# Patient Record
Sex: FEMALE | Race: WHITE | NOT HISPANIC OR LATINO | URBAN - METROPOLITAN AREA
[De-identification: names, ages, dates, MRNs, and addresses within clinical notes are randomized per-mention and may not be internally consistent; named-entity substitution may affect disease eponyms.]

---

## 2017-02-13 ENCOUNTER — EMERGENCY (EMERGENCY)
Facility: HOSPITAL | Age: 57
LOS: 1 days | Discharge: HOME | End: 2017-02-13
Attending: EMERGENCY MEDICINE
Payer: SELF-PAY

## 2017-02-13 VITALS
SYSTOLIC BLOOD PRESSURE: 103 MMHG | RESPIRATION RATE: 16 BRPM | HEART RATE: 71 BPM | DIASTOLIC BLOOD PRESSURE: 67 MMHG | HEIGHT: 66 IN | TEMPERATURE: 98 F | WEIGHT: 134.92 LBS | OXYGEN SATURATION: 94 %
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
## 2017-02-13 DIAGNOSIS — T40.601A POISONING BY UNSPECIFIED NARCOTICS, ACCIDENTAL (UNINTENTIONAL), INITIAL ENCOUNTER: ICD-10-CM
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
## 2017-02-13 LAB
ALBUMIN SERPL ELPH-MCNC: 3 G/DL — LOW (ref 3.5–5)
ALP SERPL-CCNC: 88 U/L — SIGNIFICANT CHANGE UP (ref 40–120)
ALT FLD-CCNC: 29 U/L DA — SIGNIFICANT CHANGE UP (ref 10–60)
ANION GAP SERPL CALC-SCNC: 6 MMOL/L — SIGNIFICANT CHANGE UP (ref 5–17)
AST SERPL-CCNC: 27 U/L — SIGNIFICANT CHANGE UP (ref 10–40)
BASOPHILS # BLD AUTO: 0.1 K/UL — SIGNIFICANT CHANGE UP (ref 0–0.2)
BASOPHILS NFR BLD AUTO: 0.7 % — SIGNIFICANT CHANGE UP (ref 0–2)
BILIRUB SERPL-MCNC: 0.1 MG/DL — LOW (ref 0.2–1.2)
BUN SERPL-MCNC: 11 MG/DL — SIGNIFICANT CHANGE UP (ref 7–18)
CALCIUM SERPL-MCNC: 8.4 MG/DL — SIGNIFICANT CHANGE UP (ref 8.4–10.5)
CHLORIDE SERPL-SCNC: 103 MMOL/L — SIGNIFICANT CHANGE UP (ref 96–108)
CO2 SERPL-SCNC: 31 MMOL/L — SIGNIFICANT CHANGE UP (ref 22–31)
CREAT SERPL-MCNC: 0.6 MG/DL — SIGNIFICANT CHANGE UP (ref 0.5–1.3)
EOSINOPHIL # BLD AUTO: 0.5 K/UL — SIGNIFICANT CHANGE UP (ref 0–0.5)
EOSINOPHIL NFR BLD AUTO: 5.8 % — SIGNIFICANT CHANGE UP (ref 0–6)
GLUCOSE SERPL-MCNC: 90 MG/DL — SIGNIFICANT CHANGE UP (ref 70–99)
HCT VFR BLD CALC: 33 % — LOW (ref 34.5–45)
HGB BLD-MCNC: 10.8 G/DL — LOW (ref 11.5–15.5)
LYMPHOCYTES # BLD AUTO: 3.2 K/UL — SIGNIFICANT CHANGE UP (ref 1–3.3)
LYMPHOCYTES # BLD AUTO: 36 % — SIGNIFICANT CHANGE UP (ref 13–44)
MCHC RBC-ENTMCNC: 29.3 PG — SIGNIFICANT CHANGE UP (ref 27–34)
MCHC RBC-ENTMCNC: 32.7 GM/DL — SIGNIFICANT CHANGE UP (ref 32–36)
MCV RBC AUTO: 89.8 FL — SIGNIFICANT CHANGE UP (ref 80–100)
MONOCYTES # BLD AUTO: 0.9 K/UL — SIGNIFICANT CHANGE UP (ref 0–0.9)
MONOCYTES NFR BLD AUTO: 10.2 % — SIGNIFICANT CHANGE UP (ref 2–14)
NEUTROPHILS # BLD AUTO: 4.2 K/UL — SIGNIFICANT CHANGE UP (ref 1.8–7.4)
NEUTROPHILS NFR BLD AUTO: 47.3 % — SIGNIFICANT CHANGE UP (ref 43–77)
PLATELET # BLD AUTO: 196 K/UL — SIGNIFICANT CHANGE UP (ref 150–400)
POTASSIUM SERPL-MCNC: 4.1 MMOL/L — SIGNIFICANT CHANGE UP (ref 3.5–5.3)
POTASSIUM SERPL-SCNC: 4.1 MMOL/L — SIGNIFICANT CHANGE UP (ref 3.5–5.3)
PROT SERPL-MCNC: 6.4 G/DL — SIGNIFICANT CHANGE UP (ref 6–8.3)
RBC # BLD: 3.67 M/UL — LOW (ref 3.8–5.2)
RBC # FLD: 11.9 % — SIGNIFICANT CHANGE UP (ref 10.3–14.5)
SODIUM SERPL-SCNC: 140 MMOL/L — SIGNIFICANT CHANGE UP (ref 135–145)
WBC # BLD: 8.8 K/UL — SIGNIFICANT CHANGE UP (ref 3.8–10.5)
WBC # FLD AUTO: 8.8 K/UL — SIGNIFICANT CHANGE UP (ref 3.8–10.5)

## 2017-02-13 PROCEDURE — 99285 EMERGENCY DEPT VISIT HI MDM: CPT

## 2017-02-13 RX ORDER — KETOROLAC TROMETHAMINE 30 MG/ML
30 SYRINGE (ML) INJECTION ONCE
Qty: 0 | Refills: 0 | Status: DISCONTINUED | OUTPATIENT
Start: 2017-02-13 | End: 2017-02-13

## 2017-02-13 RX ORDER — ONDANSETRON 8 MG/1
4 TABLET, FILM COATED ORAL ONCE
Qty: 0 | Refills: 0 | Status: COMPLETED | OUTPATIENT
Start: 2017-02-13 | End: 2017-02-13

## 2017-02-13 RX ORDER — NALOXONE HYDROCHLORIDE 4 MG/.1ML
0.2 SPRAY NASAL ONCE
Qty: 0 | Refills: 0 | Status: COMPLETED | OUTPATIENT
Start: 2017-02-13 | End: 2017-02-13

## 2017-02-13 RX ORDER — SODIUM CHLORIDE 9 MG/ML
1000 INJECTION INTRAMUSCULAR; INTRAVENOUS; SUBCUTANEOUS ONCE
Qty: 0 | Refills: 0 | Status: COMPLETED | OUTPATIENT
Start: 2017-02-13 | End: 2017-02-13

## 2017-02-13 RX ADMIN — SODIUM CHLORIDE 1000 MILLILITER(S): 9 INJECTION INTRAMUSCULAR; INTRAVENOUS; SUBCUTANEOUS at 21:57

## 2017-02-13 RX ADMIN — ONDANSETRON 4 MILLIGRAM(S): 8 TABLET, FILM COATED ORAL at 21:58

## 2017-02-13 RX ADMIN — Medication 0.1 MILLIGRAM(S): at 21:58

## 2017-02-13 RX ADMIN — Medication 30 MILLIGRAM(S): at 22:30

## 2017-02-13 RX ADMIN — NALOXONE HYDROCHLORIDE 0.2 MILLIGRAM(S): 4 SPRAY NASAL at 21:35

## 2017-02-13 RX ADMIN — Medication 30 MILLIGRAM(S): at 21:58

## 2017-02-13 NOTE — ED PROVIDER NOTE - NS ED MD SCRIBE ATTENDING SCRIBE SECTIONS
PAST MEDICAL/SURGICAL/SOCIAL HISTORY/HIV/HISTORY OF PRESENT ILLNESS/VITAL SIGNS( Pullset)/REVIEW OF SYSTEMS/PHYSICAL EXAM/DISPOSITION

## 2017-02-13 NOTE — ED ADULT NURSE NOTE - ED STAT RN HANDOFF DETAILS
Patient drowsy, able to reorient with verbal stimuli, continue on cardiac monitor, endorsed to Dev RN for continuation of care. Patient drowsy, able to reorient with verbal stimuli, continue on cardiac monitor, endorsed to Dev RN for continuation of care, refuses  social work

## 2017-02-13 NOTE — ED PROVIDER NOTE - OBJECTIVE STATEMENT
57 y/o M with no significant PMHx BIB EMS to ED presenting overdose. Pt is lethargic in ED and minimally responsive. Belongings suggest pt is in a opioid addiction program and is found to have a bottle full of Citalopram in her bag. When pt comes to, she says that she had her usual 175mg of methadone today but also had Clonopin as well. Denies fever, nausea, vomiting, pain or any other complaints. NKDA.

## 2017-02-13 NOTE — ED PROVIDER NOTE - PROGRESS NOTE DETAILS
pt signed over to me Gaudencio by Dr Johnston. pt reportedly c overdose on klonopin and methadone, mental status improved briefly with narcan, no reported si/hi. sign out to observe until sober/alert. pt currently sleeping/drowsy, will awaken to strong verbal stimuli and will answer questions coherently. maintaining airway. will continue observation in ED. give iv fluids for low bp. pt still drowsy, but c/o moderate headache. no visible head trauma, but given pt's ams, will check ct head. pt much more awake now. able to walk to bathroom and back with supervision. no si/hi/hallucination. denies intentional self harm. pt has been awake, alert, coherent. ate sandwich, drank milk. walking steadily around ED. no si/hi/hallucinations. declines social work consult.

## 2017-02-13 NOTE — ED PROVIDER NOTE - CONSTITUTIONAL, MLM
normal... Well appearing, well nourished.  alert, oriented to person, place, time/situation and in no apparent distress. Well appearing, well nourished. Pt is sleeping in ED, snoring. In no apparent distress.

## 2017-02-13 NOTE — ED PROVIDER NOTE - EYES, MLM
Clear bilaterally, pupils equal, round and reactive to light. Clear bilaterally, pupils equal, round and minimally reactive to light.

## 2017-02-14 VITALS
SYSTOLIC BLOOD PRESSURE: 99 MMHG | RESPIRATION RATE: 14 BRPM | HEART RATE: 65 BPM | DIASTOLIC BLOOD PRESSURE: 69 MMHG | OXYGEN SATURATION: 95 %

## 2017-02-14 PROCEDURE — 96374 THER/PROPH/DIAG INJ IV PUSH: CPT

## 2017-02-14 PROCEDURE — 96375 TX/PRO/DX INJ NEW DRUG ADDON: CPT

## 2017-02-14 PROCEDURE — 70450 CT HEAD/BRAIN W/O DYE: CPT | Mod: 26

## 2017-02-14 PROCEDURE — 70450 CT HEAD/BRAIN W/O DYE: CPT

## 2017-02-14 PROCEDURE — 99284 EMERGENCY DEPT VISIT MOD MDM: CPT | Mod: 25

## 2017-02-14 PROCEDURE — 36416 COLLJ CAPILLARY BLOOD SPEC: CPT

## 2017-02-14 PROCEDURE — 85027 COMPLETE CBC AUTOMATED: CPT

## 2017-02-14 PROCEDURE — 80053 COMPREHEN METABOLIC PANEL: CPT

## 2017-02-14 RX ORDER — IBUPROFEN 200 MG
400 TABLET ORAL ONCE
Qty: 0 | Refills: 0 | Status: COMPLETED | OUTPATIENT
Start: 2017-02-14 | End: 2017-02-14

## 2017-02-14 RX ORDER — SODIUM CHLORIDE 9 MG/ML
1000 INJECTION INTRAMUSCULAR; INTRAVENOUS; SUBCUTANEOUS ONCE
Qty: 0 | Refills: 0 | Status: COMPLETED | OUTPATIENT
Start: 2017-02-14 | End: 2017-02-14

## 2017-02-14 RX ORDER — ACETAMINOPHEN 500 MG
975 TABLET ORAL ONCE
Qty: 0 | Refills: 0 | Status: COMPLETED | OUTPATIENT
Start: 2017-02-14 | End: 2017-02-14

## 2017-02-14 RX ADMIN — SODIUM CHLORIDE 1000 MILLILITER(S): 9 INJECTION INTRAMUSCULAR; INTRAVENOUS; SUBCUTANEOUS at 01:55

## 2017-02-14 RX ADMIN — Medication 400 MILLIGRAM(S): at 05:15

## 2017-02-14 RX ADMIN — SODIUM CHLORIDE 1000 MILLILITER(S): 9 INJECTION INTRAMUSCULAR; INTRAVENOUS; SUBCUTANEOUS at 05:29

## 2017-02-14 RX ADMIN — Medication 400 MILLIGRAM(S): at 04:36

## 2017-02-14 NOTE — ED ADULT NURSE REASSESSMENT NOTE - NS ED NURSE REASSESS COMMENT FT1
pt is awake and responsive ambulates gait is steady b improved , pt ambulated to the bathroom oral care

## 2017-07-04 ENCOUNTER — EMERGENCY (EMERGENCY)
Facility: HOSPITAL | Age: 57
LOS: 1 days | Discharge: HOME | End: 2017-07-04
Attending: EMERGENCY MEDICINE | Admitting: EMERGENCY MEDICINE
Payer: MEDICARE

## 2017-07-04 VITALS
HEART RATE: 86 BPM | DIASTOLIC BLOOD PRESSURE: 75 MMHG | SYSTOLIC BLOOD PRESSURE: 114 MMHG | TEMPERATURE: 98 F | HEIGHT: 65 IN | RESPIRATION RATE: 18 BRPM | WEIGHT: 141.98 LBS | OXYGEN SATURATION: 94 %

## 2017-07-04 VITALS
OXYGEN SATURATION: 97 % | RESPIRATION RATE: 17 BRPM | HEART RATE: 88 BPM | SYSTOLIC BLOOD PRESSURE: 113 MMHG | DIASTOLIC BLOOD PRESSURE: 66 MMHG

## 2017-07-04 DIAGNOSIS — Z88.0 ALLERGY STATUS TO PENICILLIN: ICD-10-CM

## 2017-07-04 DIAGNOSIS — Z88.8 ALLERGY STATUS TO OTHER DRUGS, MEDICAMENTS AND BIOLOGICAL SUBSTANCES: ICD-10-CM

## 2017-07-04 DIAGNOSIS — J44.1 CHRONIC OBSTRUCTIVE PULMONARY DISEASE WITH (ACUTE) EXACERBATION: ICD-10-CM

## 2017-07-04 DIAGNOSIS — R07.89 OTHER CHEST PAIN: ICD-10-CM

## 2017-07-04 DIAGNOSIS — M25.562 PAIN IN LEFT KNEE: ICD-10-CM

## 2017-07-04 DIAGNOSIS — Z88.1 ALLERGY STATUS TO OTHER ANTIBIOTIC AGENTS STATUS: ICD-10-CM

## 2017-07-04 DIAGNOSIS — Z88.2 ALLERGY STATUS TO SULFONAMIDES: ICD-10-CM

## 2017-07-04 LAB
ALBUMIN SERPL ELPH-MCNC: 3.5 G/DL — SIGNIFICANT CHANGE UP (ref 3.3–5)
ALP SERPL-CCNC: 96 U/L — SIGNIFICANT CHANGE UP (ref 40–120)
ALT FLD-CCNC: 63 U/L — HIGH (ref 10–45)
ANION GAP SERPL CALC-SCNC: 13 MMOL/L — SIGNIFICANT CHANGE UP (ref 5–17)
ANISOCYTOSIS BLD QL: SLIGHT — SIGNIFICANT CHANGE UP
AST SERPL-CCNC: 89 U/L — HIGH (ref 10–40)
BASOPHILS NFR BLD AUTO: 1 % — SIGNIFICANT CHANGE UP (ref 0–2)
BILIRUB SERPL-MCNC: 0.2 MG/DL — SIGNIFICANT CHANGE UP (ref 0.2–1.2)
BUN SERPL-MCNC: 10 MG/DL — SIGNIFICANT CHANGE UP (ref 7–23)
CALCIUM SERPL-MCNC: 8.7 MG/DL — SIGNIFICANT CHANGE UP (ref 8.4–10.5)
CHLORIDE SERPL-SCNC: 99 MMOL/L — SIGNIFICANT CHANGE UP (ref 96–108)
CK MB CFR SERPL CALC: 2.3 NG/ML — SIGNIFICANT CHANGE UP (ref 0–6.7)
CK SERPL-CCNC: 150 U/L — SIGNIFICANT CHANGE UP (ref 25–170)
CO2 SERPL-SCNC: 26 MMOL/L — SIGNIFICANT CHANGE UP (ref 22–31)
CREAT SERPL-MCNC: 0.6 MG/DL — SIGNIFICANT CHANGE UP (ref 0.5–1.3)
DACRYOCYTES BLD QL SMEAR: SLIGHT — SIGNIFICANT CHANGE UP
EOSINOPHIL NFR BLD AUTO: 9 % — HIGH (ref 0–6)
GLUCOSE SERPL-MCNC: 108 MG/DL — HIGH (ref 70–99)
HCT VFR BLD CALC: 34.4 % — LOW (ref 34.5–45)
HGB BLD-MCNC: 11.4 G/DL — LOW (ref 11.5–15.5)
LYMPHOCYTES # BLD AUTO: 36 % — SIGNIFICANT CHANGE UP (ref 13–44)
LYMPHOCYTES # SPEC AUTO: 5 % — SIGNIFICANT CHANGE UP
MACROCYTES BLD QL: SLIGHT — SIGNIFICANT CHANGE UP
MANUAL DIF COMMENT BLD-IMP: SIGNIFICANT CHANGE UP
MANUAL SMEAR VERIFICATION: SIGNIFICANT CHANGE UP
MCHC RBC-ENTMCNC: 29.8 PG — SIGNIFICANT CHANGE UP (ref 27–34)
MCHC RBC-ENTMCNC: 33.1 G/DL — SIGNIFICANT CHANGE UP (ref 32–36)
MCV RBC AUTO: 89.8 FL — SIGNIFICANT CHANGE UP (ref 80–100)
MICROCYTES BLD QL: SLIGHT — SIGNIFICANT CHANGE UP
MONOCYTES NFR BLD AUTO: 9 % — SIGNIFICANT CHANGE UP (ref 2–14)
NEUTROPHILS NFR BLD AUTO: 38 % — LOW (ref 43–77)
NEUTS BAND # BLD: 2 % — SIGNIFICANT CHANGE UP
OVALOCYTES BLD QL SMEAR: SLIGHT — SIGNIFICANT CHANGE UP
PLAT MORPH BLD: (no result)
PLATELET # BLD AUTO: 234 K/UL — SIGNIFICANT CHANGE UP (ref 150–400)
POIKILOCYTOSIS BLD QL AUTO: SLIGHT — SIGNIFICANT CHANGE UP
POTASSIUM SERPL-MCNC: 4.2 MMOL/L — SIGNIFICANT CHANGE UP (ref 3.5–5.3)
POTASSIUM SERPL-SCNC: 4.2 MMOL/L — SIGNIFICANT CHANGE UP (ref 3.5–5.3)
PROT SERPL-MCNC: 6.6 G/DL — SIGNIFICANT CHANGE UP (ref 6–8.3)
RBC # BLD: 3.83 M/UL — SIGNIFICANT CHANGE UP (ref 3.8–5.2)
RBC # FLD: 13 % — SIGNIFICANT CHANGE UP (ref 10.3–16.9)
RBC BLD AUTO: (no result)
SODIUM SERPL-SCNC: 138 MMOL/L — SIGNIFICANT CHANGE UP (ref 135–145)
SPHEROCYTES BLD QL SMEAR: SLIGHT — SIGNIFICANT CHANGE UP
TROPONIN T SERPL-MCNC: <0.01 NG/ML — SIGNIFICANT CHANGE UP (ref 0–0.01)
WBC # BLD: 6.3 K/UL — SIGNIFICANT CHANGE UP (ref 3.8–10.5)
WBC # FLD AUTO: 6.3 K/UL — SIGNIFICANT CHANGE UP (ref 3.8–10.5)

## 2017-07-04 PROCEDURE — 96374 THER/PROPH/DIAG INJ IV PUSH: CPT

## 2017-07-04 PROCEDURE — 94640 AIRWAY INHALATION TREATMENT: CPT

## 2017-07-04 PROCEDURE — 36415 COLL VENOUS BLD VENIPUNCTURE: CPT

## 2017-07-04 PROCEDURE — 82550 ASSAY OF CK (CPK): CPT

## 2017-07-04 PROCEDURE — 99285 EMERGENCY DEPT VISIT HI MDM: CPT | Mod: 25

## 2017-07-04 PROCEDURE — 93010 ELECTROCARDIOGRAM REPORT: CPT

## 2017-07-04 PROCEDURE — 85025 COMPLETE CBC W/AUTO DIFF WBC: CPT

## 2017-07-04 PROCEDURE — 99284 EMERGENCY DEPT VISIT MOD MDM: CPT | Mod: 25

## 2017-07-04 PROCEDURE — 71010: CPT | Mod: 26

## 2017-07-04 PROCEDURE — 71045 X-RAY EXAM CHEST 1 VIEW: CPT

## 2017-07-04 PROCEDURE — 82553 CREATINE MB FRACTION: CPT

## 2017-07-04 PROCEDURE — 84484 ASSAY OF TROPONIN QUANT: CPT

## 2017-07-04 PROCEDURE — 93005 ELECTROCARDIOGRAM TRACING: CPT

## 2017-07-04 PROCEDURE — 80053 COMPREHEN METABOLIC PANEL: CPT

## 2017-07-04 RX ORDER — IPRATROPIUM/ALBUTEROL SULFATE 18-103MCG
3 AEROSOL WITH ADAPTER (GRAM) INHALATION ONCE
Qty: 0 | Refills: 0 | Status: COMPLETED | OUTPATIENT
Start: 2017-07-04 | End: 2017-07-04

## 2017-07-04 RX ADMIN — Medication 125 MILLIGRAM(S): at 09:14

## 2017-07-04 RX ADMIN — Medication 3 MILLILITER(S): at 09:12

## 2017-07-04 NOTE — ED PROVIDER NOTE - OBJECTIVE STATEMENT
55 y/o f complains of chest tightness and wheezing.  Hx of COPD and smoking.  Denies cough or fever.  Also c/o left knee pain from torn meniscus.  Denies new injury.  Pt appears somnolent throughout interview but denies taking any drugs.

## 2017-07-04 NOTE — ED PROVIDER NOTE - MEDICAL DECISION MAKING DETAILS
pt with wheezing most likely from COPD exacerbation pe - no acute resp distress, wheezing bilaterally, labs and xray wnl, received nebs and steroids with good relief of symptoms - more alert at discharge, will follow up with her PMD and ortho

## 2017-07-04 NOTE — ED ADULT NURSE NOTE - OBJECTIVE STATEMENT
intermittent, non-radiating midsternal chest tightness that started this morning and left knee pain. Hx:  COPD, old left knee injury, current smoker. Denies taking drugs, alcohol, recent falls, head trauma.  Vital signs stable.  Patient in no acute distress.

## 2017-07-04 NOTE — ED ADULT NURSE NOTE - CHPI ED SYMPTOMS NEG
no cough/no syncope/no fever/no shortness of breath/no dizziness/no nausea/no chills/no diaphoresis/no back pain/no vomiting

## 2017-12-01 ENCOUNTER — HOSPITAL ENCOUNTER (INPATIENT)
Dept: HOSPITAL 74 - YASAS | Age: 57
LOS: 28 days | Discharge: HOME | DRG: 895 | End: 2017-12-29
Attending: PSYCHIATRY & NEUROLOGY | Admitting: PSYCHIATRY & NEUROLOGY
Payer: COMMERCIAL

## 2017-12-01 VITALS — BODY MASS INDEX: 25.8 KG/M2

## 2017-12-01 DIAGNOSIS — J43.9: ICD-10-CM

## 2017-12-01 DIAGNOSIS — F14.20: ICD-10-CM

## 2017-12-01 DIAGNOSIS — F17.210: ICD-10-CM

## 2017-12-01 DIAGNOSIS — K42.9: ICD-10-CM

## 2017-12-01 DIAGNOSIS — B96.89: ICD-10-CM

## 2017-12-01 DIAGNOSIS — R73.9: ICD-10-CM

## 2017-12-01 DIAGNOSIS — N39.0: ICD-10-CM

## 2017-12-01 DIAGNOSIS — K21.9: ICD-10-CM

## 2017-12-01 DIAGNOSIS — R60.0: ICD-10-CM

## 2017-12-01 DIAGNOSIS — F31.81: ICD-10-CM

## 2017-12-01 DIAGNOSIS — N76.0: ICD-10-CM

## 2017-12-01 DIAGNOSIS — Z88.1: ICD-10-CM

## 2017-12-01 DIAGNOSIS — B18.2: ICD-10-CM

## 2017-12-01 DIAGNOSIS — Z86.73: ICD-10-CM

## 2017-12-01 DIAGNOSIS — R07.89: ICD-10-CM

## 2017-12-01 DIAGNOSIS — J45.909: ICD-10-CM

## 2017-12-01 DIAGNOSIS — Z88.0: ICD-10-CM

## 2017-12-01 DIAGNOSIS — F11.23: Primary | ICD-10-CM

## 2017-12-01 PROCEDURE — HZ42ZZZ GROUP COUNSELING FOR SUBSTANCE ABUSE TREATMENT, COGNITIVE-BEHAVIORAL: ICD-10-PCS | Performed by: PSYCHIATRY & NEUROLOGY

## 2017-12-01 PROCEDURE — G0008 ADMIN INFLUENZA VIRUS VAC: HCPCS

## 2017-12-01 RX ADMIN — BUDESONIDE AND FORMOTEROL FUMARATE DIHYDRATE SCH PUFF: 80; 4.5 AEROSOL RESPIRATORY (INHALATION) at 23:49

## 2017-12-01 RX ADMIN — ALBUTEROL SULFATE SCH PUFF: 90 AEROSOL, METERED RESPIRATORY (INHALATION) at 23:51

## 2017-12-01 RX ADMIN — Medication SCH MG: at 23:51

## 2017-12-01 RX ADMIN — HYDROXYZINE PAMOATE PRN MG: 50 CAPSULE ORAL at 23:52

## 2017-12-01 NOTE — HP
Admission ROS [facility]





- [facility]


Chief Complaint: 





SEEKING REHAB SERVICES 


Allergies/Adverse Reactions: 


 Allergies











Allergy/AdvReac Type Severity Reaction Status Date / Time


 


ciprofloxacin [From Cipro] Allergy Severe Difficulty Verified 17 15:50





   Breathing  


 


ciprofloxacin HCl Allergy Severe Difficulty Verified 17 15:50





[From Cipro]   Breathing  


 


clarithromycin [From Biaxin] Allergy Severe Difficulty Verified 17 15:50





   Breathing  


 


Penicillins Allergy Severe Difficulty Verified 17 15:49





   Breathing  


 


Sulfa (Sulfonamide Allergy Severe Difficulty Verified 17 15:49





Antibiotics)   Breathing  


 


cyclobenzaprine HCl AdvReac  Difficulty Verified 17 17:34





[From Flexeril]   Breathing  











History of Present Illness: 





57 Y.O. WOMAN IS HERE SEEKING REHAB SERVICES.  ADMITTED WAS G[facility]INI [facility]IZ FROM -17 FOR TREATMENT OF DEPRESSION.  SHE REPORTS SHE IS ENROLLED IN 

[facility] AND WAS RECEIVING 180MG OF METHADONE PRIOR TO BEING 

HOSPITALIZED.  HOWEVER, PER DISCHARGE PAPERWORK,  SHE WAS MEDICATED WITH 20MG 

OF METHADONE BID DURING HER HOSPITALIZATION. SHE DENIES WITHDRAWAL SYMPTOMS. 


Exam Limitations: No Limitations





- Ebola screening


Have you traveled outside of the country in the last 21 days: No (N)


Have you had contact with anyone from an Ebola affected area: No


Have you been sick,other than usual withdrawal symptoms: No


Do you have a fever: No





- Review of Systems


Constitutional: No Symptoms Reported


EENT: reports: No Symptoms Reported


Respiratory: reports: Cough


Cardiac: reports: No Symptoms Reported


GI: reports: Constipated


: reports: No Symptoms Reported


Musculoskeletal: reports: Back Pain, Neck Pain


Integumentary: reports: No Symptoms Reported


Neuro: reports: Headache


Endocrine: reports: No Symptoms Reported


Hematology: reports: No Symptoms Reported


Psychiatric: reports: Mood/Affect Appropiate, Anxious, Depressed


Other Systems: Reviewed and Negative





Patient History





- Patient Medical History


Hx Anemia: No


Hx Asthma: Yes


Hx Chronic Obstructive Pulmonary Disease (COPD): Yes (Emphysema)


Hx Cancer: No


Hx Cardiac Disorders: No


Hx Congestive Heart Failure: No


Hx Hypertension: No ( )


Hx Hypercholesterolemia: No


Hx Pacemaker: No


HX Cerebrovascular Accident: Yes (MINI STROKES WHE SHE WAS IN HER 30'S )


Hx Seizures: No


Hx Dementia: No


Hx Diabetes: No


Hx Gastrointestinal Disorders: Yes (GERD)


Hx Liver Disease: Yes


Hx Genitourinary Disorders: No


Hx Sexually Transmitted Disorders: No


Hx Renal Disease (ESRD): No


Hx Thyroid Disease: No


Hx Human Immunodeficiency Virus (HIV): No


Hx Hepatitis C: Yes


Hx Depression: Yes


Hx Suicide Attempt: No


Hx Bipolar Disorder: No


Hx Schizophrenia: No


Other Medical History: PTSD 





- Patient Surgical History


Past Surgical History: Yes


Hx Orthopedic Surgery: Yes (ORTHOSCOPIC SX TO RIGHT KNEE)


Hx Hysterectomy: Yes (PARTIAL HYSTERECTOMY )


Anesthesia Reaction: No





- PPD History


Previous Implant?: Yes


Documented Results: Negative w/o proof


PPD to be Administered?: Yes





- Reproductive History


Patient is a Female of Child Bearing Age (11 -55 yrs old): No


Patient Pregnant: No





- Smoking Cessation


Smoking history: Current every day smoker


Have you smoked in the past 12 months: Yes


Aproximately how many cigarettes per day: 16


Initiated information on smoking cessation: Yes


'Breaking Loose' booklet given: 17





- Substance & Tx. History


Hx Alcohol Use: No


Hx Substance Use: Yes


Substance Use Type: Heroin, Opiates


Hx Substance Use Treatment: Yes (DETOX AND REHAB: DOES NOT RECALL DATES )





- Substances Abused


  ** Heroin


Route: Injection


Frequency: Daily


Amount used: 2 BUNDLES 


Age of first use: 52


Date of Last Use: 17





Family Disease History





- Family Disease History


Family Disease History: Respiratory: Father (: COPD ), Mother (

: Emphysema)





Admission Physical Exam [facility]





- Vital Signs


Vital Signs: 


 Vital Signs - 24 hr











  17





  14:52


 


Temperature 98.1 F


 


Pulse Rate 86


 


Respiratory 18





Rate 


 


Blood Pressure 109/70














- Physical


General Appearance: Yes: No Apparent Distress, Nourished, Appropriately Dressed


HEENTM: Yes: Hearing grossly Normal, Normocephalic, Normal Voice


Respiratory: Yes: Chest Non-Tender, Lungs Clear, Normal Breath Sounds, No 

Respiratory Distress, No Accessory Muscle Use


Neck: Yes: No masses,lesions,Nodules, Trachea in good position


Breast: Yes: Breast Exam Deferred


Cardiology: Yes: Regular Rhythm, Regular Rate


Abdominal: Yes: Normal Bowel Sounds, Non Tender, Flat


Genitourinary: Yes: Other (NO COMPLAINTS REPORTED)


Back: Yes: Normal Inspection


Musculoskeletal: Yes: full range of Motion, Gait Steady, Pelvis Stable


Extremities: Yes: Normal Inspection, Normal Range of Motion, Non-Tender


Neurological: Yes: CNs II-XII NML intact, Fully Oriented, Alert, Normal Mood/

Affect, Normal Response


Integumentary: Yes: Normal Color, Dry, Warm


Lymphatic: Yes: Within Normal Limits





- Diagnostic


(1) GERD (gastroesophageal reflux disease)


Current Visit: Yes   Status: Chronic   





(2) Nicotine dependence


Current Visit: Yes   Status: Chronic   





(3) Opioid dependence on agonist therapy


Current Visit: Yes   Status: Chronic   





(4) Chronic hepatitis C


Current Visit: Yes   Status: Chronic   





(5) COPD (chronic obstructive pulmonary disease)


Current Visit: Yes   Status: Chronic   





Cleared for Admission [facility]





- Detox or Rehab


[facility] Level of Care: Observation Bed


Claeared for Rehab Admission: Yes





[facility] Breath Alcohol Content


Breath Alcohol Content: 0





Urine Pregancy Test





- Result


Urine Pregnancy Test Results: Negative- NO Line Present





Urine Drug Screen





- Results


Drug Screen Negative: No


Urine Drug Screen Results: MTD-Methadone





Inpatient Rehab Admission





- Initial Determination


Are CD services needed?: Yes


Free of communicable disease: No


Not in need of hospitalization: Yes





- Rehab Admission Criteria


Previous failed treatment: Yes


Poor recovery environment: Yes


Comorbidities: Yes


Lacks judgement: Yes


Patient is meeting Inpatient Rehab admission criteria:: Yes

## 2017-12-02 LAB
ALBUMIN SERPL-MCNC: 3.1 G/DL (ref 3.4–5)
ALP SERPL-CCNC: 95 U/L (ref 45–117)
ALT SERPL-CCNC: 90 U/L (ref 12–78)
ANION GAP SERPL CALC-SCNC: 6 MMOL/L (ref 8–16)
APPEARANCE UR: (no result)
AST SERPL-CCNC: 77 U/L (ref 15–37)
BACTERIA #/AREA URNS HPF: (no result) /HPF
BILIRUB SERPL-MCNC: 0.5 MG/DL (ref 0.2–1)
BILIRUB UR STRIP.AUTO-MCNC: NEGATIVE MG/DL
BUN SERPL-MCNC: 20 MG/DL (ref 7–18)
CALCIUM SERPL-MCNC: 8.4 MG/DL (ref 8.5–10.1)
CHLORIDE SERPL-SCNC: 101 MMOL/L (ref 98–107)
CO2 SERPL-SCNC: 29 MMOL/L (ref 21–32)
COLOR UR: YELLOW
CREAT SERPL-MCNC: 0.8 MG/DL (ref 0.55–1.02)
DEPRECATED RDW RBC AUTO: 14.1 % (ref 11.6–15.6)
EPITH CASTS URNS QL MICRO: (no result) /HPF
GLUCOSE SERPL-MCNC: 130 MG/DL (ref 74–106)
HCT VFR BLD CALC: 34.4 % (ref 32.4–45.2)
HGB BLD-MCNC: 11.5 GM/DL (ref 10.7–15.3)
KETONES UR QL STRIP: NEGATIVE
LEUKOCYTE ESTERASE UR QL STRIP.AUTO: (no result)
MCH RBC QN AUTO: 28.3 PG (ref 25.7–33.7)
MCHC RBC AUTO-ENTMCNC: 33.3 G/DL (ref 32–36)
MCV RBC: 84.9 FL (ref 80–96)
MUCOUS THREADS URNS QL MICRO: (no result)
NITRITE UR QL STRIP: POSITIVE
PH UR: 5 [PH] (ref 5–8)
PLATELET # BLD AUTO: 201 K/MM3 (ref 134–434)
PMV BLD: 9.6 FL (ref 7.5–11.1)
POTASSIUM SERPLBLD-SCNC: 4.5 MMOL/L (ref 3.5–5.1)
PROT SERPL-MCNC: 6.5 G/DL (ref 6.4–8.2)
PROT UR QL STRIP: NEGATIVE
PROT UR QL STRIP: NEGATIVE
RBC # BLD AUTO: 4.05 M/MM3 (ref 3.6–5.2)
RBC # UR STRIP: NEGATIVE /UL
SODIUM SERPL-SCNC: 136 MMOL/L (ref 136–145)
SP GR UR: 1.01 (ref 1–1.03)
UROBILINOGEN UR STRIP-MCNC: NEGATIVE MG/DL (ref 0.2–1)
WBC # BLD AUTO: 7.2 K/MM3 (ref 4–10)

## 2017-12-02 RX ADMIN — ALBUTEROL SULFATE SCH: 90 AEROSOL, METERED RESPIRATORY (INHALATION) at 00:09

## 2017-12-02 RX ADMIN — MAGNESIUM HYDROXIDE PRN ML: 400 SUSPENSION ORAL at 02:48

## 2017-12-02 RX ADMIN — ALBUTEROL SULFATE SCH: 90 AEROSOL, METERED RESPIRATORY (INHALATION) at 12:06

## 2017-12-02 RX ADMIN — QUETIAPINE FUMARATE SCH MG: 100 TABLET ORAL at 21:22

## 2017-12-02 RX ADMIN — HYDROXYZINE PAMOATE PRN MG: 50 CAPSULE ORAL at 23:45

## 2017-12-02 RX ADMIN — PANTOPRAZOLE SODIUM SCH MG: 40 TABLET, DELAYED RELEASE ORAL at 09:13

## 2017-12-02 RX ADMIN — IBUPROFEN PRN MG: 400 TABLET, FILM COATED ORAL at 15:34

## 2017-12-02 RX ADMIN — NICOTINE SCH MG: 21 PATCH TRANSDERMAL at 09:12

## 2017-12-02 RX ADMIN — ALBUTEROL SULFATE SCH: 90 AEROSOL, METERED RESPIRATORY (INHALATION) at 09:14

## 2017-12-02 RX ADMIN — BUDESONIDE AND FORMOTEROL FUMARATE DIHYDRATE SCH PUFF: 80; 4.5 AEROSOL RESPIRATORY (INHALATION) at 09:13

## 2017-12-02 RX ADMIN — HYDROXYZINE PAMOATE PRN MG: 50 CAPSULE ORAL at 15:36

## 2017-12-02 RX ADMIN — GABAPENTIN SCH MG: 300 CAPSULE ORAL at 06:43

## 2017-12-02 RX ADMIN — GABAPENTIN SCH MG: 300 CAPSULE ORAL at 21:22

## 2017-12-02 RX ADMIN — Medication SCH MG: at 21:22

## 2017-12-02 RX ADMIN — METHADONE HYDROCHLORIDE SCH MG: 40 TABLET ORAL at 07:39

## 2017-12-02 RX ADMIN — Medication SCH TAB: at 09:13

## 2017-12-02 RX ADMIN — BUDESONIDE AND FORMOTEROL FUMARATE DIHYDRATE SCH PUFF: 80; 4.5 AEROSOL RESPIRATORY (INHALATION) at 21:21

## 2017-12-02 RX ADMIN — CITALOPRAM HYDROBROMIDE SCH MG: 20 TABLET ORAL at 09:12

## 2017-12-02 RX ADMIN — GABAPENTIN SCH MG: 300 CAPSULE ORAL at 13:00

## 2017-12-02 RX ADMIN — ALBUTEROL SULFATE SCH: 90 AEROSOL, METERED RESPIRATORY (INHALATION) at 06:02

## 2017-12-03 RX ADMIN — Medication SCH TAB: at 09:48

## 2017-12-03 RX ADMIN — GABAPENTIN SCH MG: 300 CAPSULE ORAL at 21:28

## 2017-12-03 RX ADMIN — GABAPENTIN SCH MG: 300 CAPSULE ORAL at 06:47

## 2017-12-03 RX ADMIN — Medication SCH MG: at 21:28

## 2017-12-03 RX ADMIN — HYDROXYZINE PAMOATE PRN MG: 50 CAPSULE ORAL at 21:54

## 2017-12-03 RX ADMIN — CITALOPRAM HYDROBROMIDE SCH MG: 20 TABLET ORAL at 09:47

## 2017-12-03 RX ADMIN — ALUMINUM HYDROXIDE, MAGNESIUM HYDROXIDE, AND SIMETHICONE PRN ML: 200; 200; 20 SUSPENSION ORAL at 06:50

## 2017-12-03 RX ADMIN — GABAPENTIN SCH MG: 300 CAPSULE ORAL at 13:05

## 2017-12-03 RX ADMIN — PANTOPRAZOLE SODIUM SCH MG: 40 TABLET, DELAYED RELEASE ORAL at 09:49

## 2017-12-03 RX ADMIN — BUDESONIDE AND FORMOTEROL FUMARATE DIHYDRATE SCH PUFF: 80; 4.5 AEROSOL RESPIRATORY (INHALATION) at 09:49

## 2017-12-03 RX ADMIN — METHADONE HYDROCHLORIDE SCH MG: 40 TABLET ORAL at 06:47

## 2017-12-03 RX ADMIN — QUETIAPINE FUMARATE SCH MG: 100 TABLET ORAL at 21:28

## 2017-12-03 RX ADMIN — NICOTINE SCH MG: 21 PATCH TRANSDERMAL at 09:47

## 2017-12-03 RX ADMIN — BUDESONIDE AND FORMOTEROL FUMARATE DIHYDRATE SCH PUFF: 80; 4.5 AEROSOL RESPIRATORY (INHALATION) at 21:27

## 2017-12-03 RX ADMIN — ACETAMINOPHEN PRN MG: 325 TABLET ORAL at 06:52

## 2017-12-03 RX ADMIN — QUETIAPINE FUMARATE SCH MG: 100 TABLET ORAL at 09:48

## 2017-12-03 NOTE — EKG
Test Reason : 

Blood Pressure : ***/*** mmHG

Vent. Rate : 087 BPM     Atrial Rate : 087 BPM

   P-R Int : 144 ms          QRS Dur : 078 ms

    QT Int : 368 ms       P-R-T Axes : 068 070 064 degrees

   QTc Int : 442 ms

 

NORMAL SINUS RHYTHM

POSSIBLE LEFT ATRIAL ENLARGEMENT

BORDERLINE ECG

NO PREVIOUS ECGS AVAILABLE

Confirmed by KELSEY PEÑA MD ([id number]) on 12/3/2017 8:34:09 AM

 

Referred By:             Confirmed By:KELSEY PEÑA MD

## 2017-12-04 RX ADMIN — BACLOFEN SCH MG: 10 TABLET ORAL at 11:22

## 2017-12-04 RX ADMIN — PANTOPRAZOLE SODIUM SCH MG: 40 TABLET, DELAYED RELEASE ORAL at 10:23

## 2017-12-04 RX ADMIN — GABAPENTIN SCH MG: 300 CAPSULE ORAL at 06:11

## 2017-12-04 RX ADMIN — BUDESONIDE AND FORMOTEROL FUMARATE DIHYDRATE SCH PUFF: 80; 4.5 AEROSOL RESPIRATORY (INHALATION) at 10:23

## 2017-12-04 RX ADMIN — NICOTINE SCH MG: 21 PATCH TRANSDERMAL at 10:23

## 2017-12-04 RX ADMIN — Medication SCH MG: at 21:30

## 2017-12-04 RX ADMIN — METHADONE HYDROCHLORIDE SCH MG: 40 TABLET ORAL at 06:10

## 2017-12-04 RX ADMIN — TRAZODONE HYDROCHLORIDE SCH MG: 100 TABLET ORAL at 21:29

## 2017-12-04 RX ADMIN — CITALOPRAM HYDROBROMIDE SCH MG: 20 TABLET ORAL at 10:23

## 2017-12-04 RX ADMIN — BUDESONIDE AND FORMOTEROL FUMARATE DIHYDRATE SCH PUFF: 80; 4.5 AEROSOL RESPIRATORY (INHALATION) at 21:30

## 2017-12-04 RX ADMIN — GABAPENTIN SCH MG: 300 CAPSULE ORAL at 21:30

## 2017-12-04 RX ADMIN — Medication SCH TAB: at 10:23

## 2017-12-04 RX ADMIN — BACLOFEN SCH MG: 10 TABLET ORAL at 21:29

## 2017-12-04 RX ADMIN — GABAPENTIN SCH MG: 300 CAPSULE ORAL at 13:17

## 2017-12-04 RX ADMIN — IBUPROFEN PRN MG: 600 TABLET, FILM COATED ORAL at 13:19

## 2017-12-04 RX ADMIN — BACLOFEN SCH: 10 TABLET ORAL at 13:20

## 2017-12-04 RX ADMIN — QUETIAPINE FUMARATE SCH MG: 100 TABLET ORAL at 21:30

## 2017-12-04 RX ADMIN — IBUPROFEN PRN MG: 400 TABLET, FILM COATED ORAL at 02:43

## 2017-12-04 RX ADMIN — QUETIAPINE FUMARATE SCH MG: 100 TABLET ORAL at 10:23

## 2017-12-04 NOTE — PN
[facility] Progress Note (SOAP)


Subjective: 





patient c/o of cravings and desire to use, dose was released while 

incarceratted also has ankle swelling and body aches, withdrwwal sx


Objective: 





12/04/17 10:56


 Vital Signs - 8 hr











  12/04/17





  06:52


 


Temperature 98.2 F


 


Pulse Rate 91 H


 


Respiratory 18





Rate 


 


Blood Pressure 137/76








 Laboratory Tests











  12/01/17 12/02/17 12/02/17





  23:00 08:20 08:20


 


WBC   7.2 


 


RBC   4.05 


 


Hgb   11.5 


 


Hct   34.4 


 


MCV   84.9 


 


MCH   28.3 


 


MCHC   33.3 


 


RDW   14.1 


 


Plt Count   201 


 


MPV   9.6 


 


Sodium    136


 


Potassium    4.5


 


Chloride    101


 


Carbon Dioxide    29


 


Anion Gap    6 L


 


BUN    20 H


 


Creatinine    0.8


 


Creat Clearance w eGFR    > 60


 


Random Glucose    130 H


 


Calcium    8.4 L


 


Total Bilirubin    0.5


 


AST    77 H


 


ALT    90 H


 


Alkaline Phosphatase    95


 


Total Protein    6.5


 


Albumin    3.1 L


 


Urine Color  Yellow  


 


Urine Appearance  Slcloudy  


 


Urine pH  5.0  


 


Ur Specific [place]  1.011  


 


Urine Protein  Negative  


 


Urine Glucose (UA)  Negative  


 


Urine Ketones  Negative  


 


Urine Blood  Negative  


 


Urine Nitrite  Positive  


 


Urine Bilirubin  Negative  


 


Urine Urobilinogen  Negative  


 


Ur Leukocyte Esterase  2+ H  


 


Urine WBC (Auto)  31  


 


Urine RBC (Auto)  1  


 


Ur Epithelial Cells  Rare  


 


Urine Bacteria  Rare  


 


Urine Mucus  Rare  


 


RPR Titer   














  12/02/17





  08:20


 


WBC 


 


RBC 


 


Hgb 


 


Hct 


 


MCV 


 


MCH 


 


MCHC 


 


RDW 


 


Plt Count 


 


MPV 


 


Sodium 


 


Potassium 


 


Chloride 


 


Carbon Dioxide 


 


Anion Gap 


 


BUN 


 


Creatinine 


 


Creat Clearance w eGFR 


 


Random Glucose 


 


Calcium 


 


Total Bilirubin 


 


AST 


 


ALT 


 


Alkaline Phosphatase 


 


Total Protein 


 


Albumin 


 


Urine Color 


 


Urine Appearance 


 


Urine pH 


 


Ur Specific Gravity 


 


Urine Protein 


 


Urine Glucose (UA) 


 


Urine Ketones 


 


Urine Blood 


 


Urine Nitrite 


 


Urine Bilirubin 


 


Urine Urobilinogen 


 


Ur Leukocyte Esterase 


 


Urine WBC (Auto) 


 


Urine RBC (Auto) 


 


Ur Epithelial Cells 


 


Urine Bacteria 


 


Urine Mucus 


 


RPR Titer  Nonreactive











Assessment: 





12/04/17 10:57


hyperglycemia, opioid withdrwal 2/2 inadequate methadoen dosing, increase 

methadone by 10mg to 50mg and reassess every 3 days, can increase dose 10-20mg/

week while in rehab if needed, dietary consult. , fluids, symptomatic relief of 

withdrwal ordered, clonidine and baclofen.
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
## 2017-12-05 RX ADMIN — BACLOFEN SCH MG: 10 TABLET ORAL at 13:08

## 2017-12-05 RX ADMIN — BUDESONIDE AND FORMOTEROL FUMARATE DIHYDRATE SCH PUFF: 80; 4.5 AEROSOL RESPIRATORY (INHALATION) at 21:32

## 2017-12-05 RX ADMIN — BACLOFEN SCH MG: 10 TABLET ORAL at 06:18

## 2017-12-05 RX ADMIN — GABAPENTIN SCH MG: 300 CAPSULE ORAL at 06:18

## 2017-12-05 RX ADMIN — ALBUTEROL SULFATE PRN PUFF: 90 AEROSOL, METERED RESPIRATORY (INHALATION) at 12:06

## 2017-12-05 RX ADMIN — GABAPENTIN SCH MG: 300 CAPSULE ORAL at 13:08

## 2017-12-05 RX ADMIN — TRAZODONE HYDROCHLORIDE SCH MG: 100 TABLET ORAL at 21:31

## 2017-12-05 RX ADMIN — Medication SCH MG: at 21:31

## 2017-12-05 RX ADMIN — CITALOPRAM HYDROBROMIDE SCH MG: 20 TABLET ORAL at 10:04

## 2017-12-05 RX ADMIN — IBUPROFEN PRN MG: 600 TABLET, FILM COATED ORAL at 06:19

## 2017-12-05 RX ADMIN — BUDESONIDE AND FORMOTEROL FUMARATE DIHYDRATE SCH PUFF: 80; 4.5 AEROSOL RESPIRATORY (INHALATION) at 10:05

## 2017-12-05 RX ADMIN — PANTOPRAZOLE SODIUM SCH MG: 40 TABLET, DELAYED RELEASE ORAL at 06:18

## 2017-12-05 RX ADMIN — PHENOL PRN EACH: 14.5 LOZENGE ORAL at 06:19

## 2017-12-05 RX ADMIN — QUETIAPINE FUMARATE SCH MG: 100 TABLET ORAL at 10:04

## 2017-12-05 RX ADMIN — GABAPENTIN SCH MG: 300 CAPSULE ORAL at 21:31

## 2017-12-05 RX ADMIN — Medication SCH TAB: at 10:04

## 2017-12-05 RX ADMIN — NICOTINE SCH MG: 21 PATCH TRANSDERMAL at 10:04

## 2017-12-05 RX ADMIN — QUETIAPINE FUMARATE SCH MG: 100 TABLET ORAL at 21:31

## 2017-12-05 RX ADMIN — MICONAZOLE NITRATE SCH SUPP: 100 SUPPOSITORY VAGINAL at 21:31

## 2017-12-06 RX ADMIN — CITALOPRAM HYDROBROMIDE SCH MG: 20 TABLET ORAL at 10:13

## 2017-12-06 RX ADMIN — BUDESONIDE AND FORMOTEROL FUMARATE DIHYDRATE SCH PUFF: 80; 4.5 AEROSOL RESPIRATORY (INHALATION) at 21:35

## 2017-12-06 RX ADMIN — PHENOL PRN EACH: 14.5 LOZENGE ORAL at 18:24

## 2017-12-06 RX ADMIN — PHENOL PRN EACH: 14.5 LOZENGE ORAL at 06:54

## 2017-12-06 RX ADMIN — IBUPROFEN PRN MG: 600 TABLET, FILM COATED ORAL at 22:35

## 2017-12-06 RX ADMIN — GABAPENTIN SCH: 300 CAPSULE ORAL at 14:31

## 2017-12-06 RX ADMIN — TRAZODONE HYDROCHLORIDE SCH MG: 100 TABLET ORAL at 21:34

## 2017-12-06 RX ADMIN — HYDROXYZINE PAMOATE PRN MG: 50 CAPSULE ORAL at 06:53

## 2017-12-06 RX ADMIN — GABAPENTIN SCH MG: 300 CAPSULE ORAL at 21:34

## 2017-12-06 RX ADMIN — QUETIAPINE FUMARATE SCH MG: 100 TABLET ORAL at 21:36

## 2017-12-06 RX ADMIN — Medication SCH MG: at 21:34

## 2017-12-06 RX ADMIN — NICOTINE SCH MG: 21 PATCH TRANSDERMAL at 10:14

## 2017-12-06 RX ADMIN — IBUPROFEN PRN MG: 600 TABLET, FILM COATED ORAL at 06:52

## 2017-12-06 RX ADMIN — MICONAZOLE NITRATE SCH SUPP: 100 SUPPOSITORY VAGINAL at 21:34

## 2017-12-06 RX ADMIN — BUDESONIDE AND FORMOTEROL FUMARATE DIHYDRATE SCH PUFF: 80; 4.5 AEROSOL RESPIRATORY (INHALATION) at 10:13

## 2017-12-06 RX ADMIN — Medication SCH TAB: at 10:14

## 2017-12-06 RX ADMIN — PANTOPRAZOLE SODIUM SCH MG: 40 TABLET, DELAYED RELEASE ORAL at 07:15

## 2017-12-06 RX ADMIN — GABAPENTIN SCH MG: 300 CAPSULE ORAL at 06:50

## 2017-12-06 RX ADMIN — ONDANSETRON ONE MG: 4 TABLET, ORALLY DISINTEGRATING ORAL at 12:06

## 2017-12-06 RX ADMIN — QUETIAPINE FUMARATE SCH MG: 100 TABLET ORAL at 10:14

## 2017-12-06 NOTE — PN
[facility] Progress Note (SOAP)


Subjective: 





would like to increae methadone dose, no sedation.  falls asleep to frequently, 

still has cravings and body aches. abdo pain and umbilical hernia, radiates to 

rlQ


Objective: 





12/06/17 11:11


 Vital Signs - 24 hr











  12/06/17 12/06/17 12/06/17





  00:30 03:30 07:14


 


Temperature   98.3 F


 


Pulse Rate   103 H


 


Respiratory 18 18 16





Rate   


 


Blood Pressure   113/69








 Laboratory Tests











  12/01/17 12/02/17 12/02/17





  23:00 08:20 08:20


 


WBC   7.2 


 


RBC   4.05 


 


Hgb   11.5 


 


Hct   34.4 


 


MCV   84.9 


 


MCH   28.3 


 


MCHC   33.3 


 


RDW   14.1 


 


Plt Count   201 


 


MPV   9.6 


 


Sodium    136


 


Potassium    4.5


 


Chloride    101


 


Carbon Dioxide    29


 


Anion Gap    6 L


 


BUN    20 H


 


Creatinine    0.8


 


Creat Clearance w eGFR    > 60


 


Random Glucose    130 H


 


Calcium    8.4 L


 


Total Bilirubin    0.5


 


AST    77 H


 


ALT    90 H


 


Alkaline Phosphatase    95


 


Total Protein    6.5


 


Albumin    3.1 L


 


Urine Color  Yellow  


 


Urine Appearance  Slcloudy  


 


Urine pH  5.0  


 


Ur Specific [place]  1.011  


 


Urine Protein  Negative  


 


Urine Glucose (UA)  Negative  


 


Urine Ketones  Negative  


 


Urine Blood  Negative  


 


Urine Nitrite  Positive  


 


Urine Bilirubin  Negative  


 


Urine Urobilinogen  Negative  


 


Ur Leukocyte Esterase  2+ H  


 


Urine WBC (Auto)  31  


 


Urine RBC (Auto)  1  


 


Ur Epithelial Cells  Rare  


 


Urine Bacteria  Rare  


 


Urine Mucus  Rare  


 


RPR Titer   














  12/02/17





  08:20


 


WBC 


 


RBC 


 


Hgb 


 


Hct 


 


MCV 


 


MCH 


 


MCHC 


 


RDW 


 


Plt Count 


 


MPV 


 


Sodium 


 


Potassium 


 


Chloride 


 


Carbon Dioxide 


 


Anion Gap 


 


BUN 


 


Creatinine 


 


Creat Clearance w eGFR 


 


Random Glucose 


 


Calcium 


 


Total Bilirubin 


 


AST 


 


ALT 


 


Alkaline Phosphatase 


 


Total Protein 


 


Albumin 


 


Urine Color 


 


Urine Appearance 


 


Urine pH 


 


Ur Specific Gravity 


 


Urine Protein 


 


Urine Glucose (UA) 


 


Urine Ketones 


 


Urine Blood 


 


Urine Nitrite 


 


Urine Bilirubin 


 


Urine Urobilinogen 


 


Ur Leukocyte Esterase 


 


Urine WBC (Auto) 


 


Urine RBC (Auto) 


 


Ur Epithelial Cells 


 


Urine Bacteria 


 


Urine Mucus 


 


RPR Titer  Nonreactive








umbilical hernia, soft non tender no rebound, no incarceration. 


Assessment: 





12/06/17 11:12


opioid withdrawal - will slowly titrate methadone dose until it is adequate - 

start 70mg dialy tomorrow had 60mg today. colace for constipation, zofran for 

nausea.

## 2017-12-07 RX ADMIN — NITROFURANTOIN MACROCRYSTALS SCH MG: 50 CAPSULE ORAL at 17:30

## 2017-12-07 RX ADMIN — MICONAZOLE NITRATE SCH SUPP: 100 SUPPOSITORY VAGINAL at 21:34

## 2017-12-07 RX ADMIN — METHADONE HYDROCHLORIDE SCH MG: 40 TABLET ORAL at 06:40

## 2017-12-07 RX ADMIN — IBUPROFEN PRN MG: 600 TABLET, FILM COATED ORAL at 17:00

## 2017-12-07 RX ADMIN — GABAPENTIN SCH MG: 300 CAPSULE ORAL at 06:41

## 2017-12-07 RX ADMIN — PANTOPRAZOLE SODIUM SCH MG: 40 TABLET, DELAYED RELEASE ORAL at 06:41

## 2017-12-07 RX ADMIN — GABAPENTIN SCH MG: 300 CAPSULE ORAL at 21:33

## 2017-12-07 RX ADMIN — Medication SCH MG: at 21:33

## 2017-12-07 RX ADMIN — Medication SCH APPLIC: at 21:35

## 2017-12-07 RX ADMIN — Medication SCH TAB: at 10:06

## 2017-12-07 RX ADMIN — NICOTINE SCH MG: 21 PATCH TRANSDERMAL at 10:06

## 2017-12-07 RX ADMIN — NICOTINE POLACRILEX PRN MG: 2 GUM, CHEWING ORAL at 17:31

## 2017-12-07 RX ADMIN — QUETIAPINE FUMARATE SCH MG: 100 TABLET ORAL at 21:33

## 2017-12-07 RX ADMIN — NITROFURANTOIN MACROCRYSTALS SCH MG: 50 CAPSULE ORAL at 23:21

## 2017-12-07 RX ADMIN — BUDESONIDE AND FORMOTEROL FUMARATE DIHYDRATE SCH PUFF: 80; 4.5 AEROSOL RESPIRATORY (INHALATION) at 10:06

## 2017-12-07 RX ADMIN — IBUPROFEN PRN MG: 600 TABLET, FILM COATED ORAL at 06:41

## 2017-12-07 RX ADMIN — GABAPENTIN SCH MG: 300 CAPSULE ORAL at 13:29

## 2017-12-07 RX ADMIN — PHENOL PRN EACH: 14.5 LOZENGE ORAL at 21:35

## 2017-12-07 RX ADMIN — TRAZODONE HYDROCHLORIDE SCH MG: 100 TABLET ORAL at 21:33

## 2017-12-07 RX ADMIN — Medication PRN APPLIC: at 15:50

## 2017-12-07 RX ADMIN — BUDESONIDE AND FORMOTEROL FUMARATE DIHYDRATE SCH PUFF: 80; 4.5 AEROSOL RESPIRATORY (INHALATION) at 21:33

## 2017-12-07 RX ADMIN — Medication SCH APPLIC: at 15:50

## 2017-12-07 RX ADMIN — CITALOPRAM HYDROBROMIDE SCH MG: 20 TABLET ORAL at 10:06

## 2017-12-07 NOTE — PN
BHS Progress Note


Note: 


uti on c& s 


will start macrodantin





bl le edema


add teds





will cont to monitor for increased swelling edema





not cw cellulitis at this time but possibly in evolution, dw rn staff

## 2017-12-08 RX ADMIN — NICOTINE SCH MG: 21 PATCH TRANSDERMAL at 10:14

## 2017-12-08 RX ADMIN — PANTOPRAZOLE SODIUM SCH MG: 40 TABLET, DELAYED RELEASE ORAL at 06:19

## 2017-12-08 RX ADMIN — NITROFURANTOIN MACROCRYSTALS SCH MG: 50 CAPSULE ORAL at 18:12

## 2017-12-08 RX ADMIN — GABAPENTIN SCH MG: 300 CAPSULE ORAL at 13:35

## 2017-12-08 RX ADMIN — IPRATROPIUM BROMIDE AND ALBUTEROL SULFATE PRN AMP: .5; 3 SOLUTION RESPIRATORY (INHALATION) at 13:35

## 2017-12-08 RX ADMIN — IBUPROFEN PRN MG: 600 TABLET, FILM COATED ORAL at 02:53

## 2017-12-08 RX ADMIN — MICONAZOLE NITRATE SCH SUPP: 100 SUPPOSITORY VAGINAL at 21:51

## 2017-12-08 RX ADMIN — GABAPENTIN SCH MG: 300 CAPSULE ORAL at 21:47

## 2017-12-08 RX ADMIN — BUDESONIDE AND FORMOTEROL FUMARATE DIHYDRATE SCH PUFF: 80; 4.5 AEROSOL RESPIRATORY (INHALATION) at 21:46

## 2017-12-08 RX ADMIN — Medication SCH TAB: at 10:13

## 2017-12-08 RX ADMIN — Medication SCH APPLIC: at 10:14

## 2017-12-08 RX ADMIN — NITROFURANTOIN MACROCRYSTALS SCH MG: 50 CAPSULE ORAL at 06:19

## 2017-12-08 RX ADMIN — HYDROXYZINE PAMOATE PRN MG: 50 CAPSULE ORAL at 18:13

## 2017-12-08 RX ADMIN — NITROFURANTOIN MACROCRYSTALS SCH MG: 50 CAPSULE ORAL at 12:08

## 2017-12-08 RX ADMIN — DOCUSATE SODIUM SCH MG: 100 CAPSULE, LIQUID FILLED ORAL at 21:51

## 2017-12-08 RX ADMIN — QUETIAPINE FUMARATE SCH MG: 100 TABLET ORAL at 21:47

## 2017-12-08 RX ADMIN — ALUMINUM HYDROXIDE, MAGNESIUM HYDROXIDE, AND SIMETHICONE PRN ML: 200; 200; 20 SUSPENSION ORAL at 18:59

## 2017-12-08 RX ADMIN — BUDESONIDE AND FORMOTEROL FUMARATE DIHYDRATE SCH PUFF: 80; 4.5 AEROSOL RESPIRATORY (INHALATION) at 10:17

## 2017-12-08 RX ADMIN — Medication SCH APPLIC: at 21:46

## 2017-12-08 RX ADMIN — METHADONE HYDROCHLORIDE SCH MG: 40 TABLET ORAL at 06:18

## 2017-12-08 RX ADMIN — Medication SCH MG: at 21:47

## 2017-12-08 RX ADMIN — GABAPENTIN SCH MG: 300 CAPSULE ORAL at 06:19

## 2017-12-08 RX ADMIN — ALBUTEROL SULFATE PRN PUFF: 90 AEROSOL, METERED RESPIRATORY (INHALATION) at 12:09

## 2017-12-08 RX ADMIN — NITROFURANTOIN MACROCRYSTALS SCH MG: 50 CAPSULE ORAL at 23:51

## 2017-12-08 RX ADMIN — ACETAMINOPHEN PRN MG: 325 TABLET ORAL at 23:50

## 2017-12-08 RX ADMIN — TRAZODONE HYDROCHLORIDE SCH MG: 100 TABLET ORAL at 21:46

## 2017-12-08 RX ADMIN — IBUPROFEN PRN MG: 600 TABLET, FILM COATED ORAL at 19:20

## 2017-12-08 RX ADMIN — CITALOPRAM HYDROBROMIDE SCH MG: 20 TABLET ORAL at 10:13

## 2017-12-09 RX ADMIN — QUETIAPINE FUMARATE SCH MG: 100 TABLET ORAL at 21:39

## 2017-12-09 RX ADMIN — BUDESONIDE AND FORMOTEROL FUMARATE DIHYDRATE SCH PUFF: 80; 4.5 AEROSOL RESPIRATORY (INHALATION) at 10:18

## 2017-12-09 RX ADMIN — DOCUSATE SODIUM SCH MG: 100 CAPSULE, LIQUID FILLED ORAL at 21:35

## 2017-12-09 RX ADMIN — NITROFURANTOIN MACROCRYSTALS SCH MG: 50 CAPSULE ORAL at 18:44

## 2017-12-09 RX ADMIN — PANTOPRAZOLE SODIUM SCH MG: 40 TABLET, DELAYED RELEASE ORAL at 06:43

## 2017-12-09 RX ADMIN — FUROSEMIDE SCH MG: 20 TABLET ORAL at 15:38

## 2017-12-09 RX ADMIN — TRAZODONE HYDROCHLORIDE SCH MG: 100 TABLET ORAL at 21:35

## 2017-12-09 RX ADMIN — IBUPROFEN PRN MG: 600 TABLET, FILM COATED ORAL at 02:33

## 2017-12-09 RX ADMIN — Medication SCH MG: at 21:35

## 2017-12-09 RX ADMIN — CITALOPRAM HYDROBROMIDE SCH MG: 20 TABLET ORAL at 10:18

## 2017-12-09 RX ADMIN — SPIRONOLACTONE SCH MG: 25 TABLET, FILM COATED ORAL at 18:44

## 2017-12-09 RX ADMIN — Medication SCH APPLIC: at 10:18

## 2017-12-09 RX ADMIN — NITROFURANTOIN MACROCRYSTALS SCH MG: 50 CAPSULE ORAL at 23:43

## 2017-12-09 RX ADMIN — IBUPROFEN PRN MG: 600 TABLET, FILM COATED ORAL at 17:00

## 2017-12-09 RX ADMIN — MICONAZOLE NITRATE SCH SUPP: 100 SUPPOSITORY VAGINAL at 21:36

## 2017-12-09 RX ADMIN — GABAPENTIN SCH MG: 300 CAPSULE ORAL at 14:38

## 2017-12-09 RX ADMIN — NITROFURANTOIN MACROCRYSTALS SCH MG: 50 CAPSULE ORAL at 11:45

## 2017-12-09 RX ADMIN — Medication SCH TAB: at 10:17

## 2017-12-09 RX ADMIN — Medication SCH APPLIC: at 21:37

## 2017-12-09 RX ADMIN — BUDESONIDE AND FORMOTEROL FUMARATE DIHYDRATE SCH PUFF: 80; 4.5 AEROSOL RESPIRATORY (INHALATION) at 21:39

## 2017-12-09 RX ADMIN — METHADONE HYDROCHLORIDE SCH MG: 40 TABLET ORAL at 06:44

## 2017-12-09 RX ADMIN — ALUMINUM HYDROXIDE, MAGNESIUM HYDROXIDE, AND SIMETHICONE PRN ML: 200; 200; 20 SUSPENSION ORAL at 18:46

## 2017-12-09 RX ADMIN — GABAPENTIN SCH MG: 300 CAPSULE ORAL at 21:36

## 2017-12-09 RX ADMIN — HYDROXYZINE PAMOATE PRN MG: 50 CAPSULE ORAL at 18:45

## 2017-12-09 RX ADMIN — NICOTINE SCH MG: 21 PATCH TRANSDERMAL at 10:17

## 2017-12-09 RX ADMIN — HYDROXYZINE PAMOATE PRN MG: 50 CAPSULE ORAL at 10:19

## 2017-12-09 RX ADMIN — NITROFURANTOIN MACROCRYSTALS SCH MG: 50 CAPSULE ORAL at 06:43

## 2017-12-09 RX ADMIN — GABAPENTIN SCH MG: 300 CAPSULE ORAL at 06:43

## 2017-12-09 NOTE — PN
[facility]HS Progress Note


Note: 





Swelling of both lower extremities, pt. is wearing elastic stockings & elevates 

LE in bed.





P : Lasix 20mg/d


Aldactone 25mg bid

## 2017-12-10 LAB
ANION GAP SERPL CALC-SCNC: 6 MMOL/L (ref 8–16)
BUN SERPL-MCNC: 20 MG/DL (ref 7–18)
CALCIUM SERPL-MCNC: 9.1 MG/DL (ref 8.5–10.1)
CHLORIDE SERPL-SCNC: 101 MMOL/L (ref 98–107)
CO2 SERPL-SCNC: 31 MMOL/L (ref 21–32)
CREAT SERPL-MCNC: 1 MG/DL (ref 0.55–1.02)
GLUCOSE SERPL-MCNC: 128 MG/DL (ref 74–106)
POTASSIUM SERPLBLD-SCNC: 4.6 MMOL/L (ref 3.5–5.1)
SODIUM SERPL-SCNC: 138 MMOL/L (ref 136–145)

## 2017-12-10 RX ADMIN — CITALOPRAM HYDROBROMIDE SCH MG: 20 TABLET ORAL at 10:06

## 2017-12-10 RX ADMIN — SPIRONOLACTONE SCH MG: 25 TABLET, FILM COATED ORAL at 10:05

## 2017-12-10 RX ADMIN — NICOTINE POLACRILEX PRN MG: 2 GUM, CHEWING ORAL at 21:38

## 2017-12-10 RX ADMIN — METHADONE HYDROCHLORIDE SCH MG: 40 TABLET ORAL at 07:05

## 2017-12-10 RX ADMIN — IBUPROFEN PRN MG: 600 TABLET, FILM COATED ORAL at 18:24

## 2017-12-10 RX ADMIN — DOCUSATE SODIUM SCH MG: 100 CAPSULE, LIQUID FILLED ORAL at 21:32

## 2017-12-10 RX ADMIN — GABAPENTIN SCH MG: 300 CAPSULE ORAL at 13:39

## 2017-12-10 RX ADMIN — Medication SCH APPLIC: at 10:06

## 2017-12-10 RX ADMIN — HYDROXYZINE PAMOATE PRN MG: 50 CAPSULE ORAL at 12:14

## 2017-12-10 RX ADMIN — BUDESONIDE AND FORMOTEROL FUMARATE DIHYDRATE SCH PUFF: 80; 4.5 AEROSOL RESPIRATORY (INHALATION) at 21:35

## 2017-12-10 RX ADMIN — SPIRONOLACTONE SCH MG: 25 TABLET, FILM COATED ORAL at 18:22

## 2017-12-10 RX ADMIN — Medication SCH APPLIC: at 21:35

## 2017-12-10 RX ADMIN — NICOTINE SCH MG: 21 PATCH TRANSDERMAL at 10:05

## 2017-12-10 RX ADMIN — MICONAZOLE NITRATE SCH SUPP: 100 SUPPOSITORY VAGINAL at 21:33

## 2017-12-10 RX ADMIN — GABAPENTIN SCH MG: 300 CAPSULE ORAL at 07:06

## 2017-12-10 RX ADMIN — ALUMINUM HYDROXIDE, MAGNESIUM HYDROXIDE, AND SIMETHICONE PRN ML: 200; 200; 20 SUSPENSION ORAL at 21:37

## 2017-12-10 RX ADMIN — PANTOPRAZOLE SODIUM SCH MG: 40 TABLET, DELAYED RELEASE ORAL at 07:06

## 2017-12-10 RX ADMIN — Medication SCH TAB: at 10:05

## 2017-12-10 RX ADMIN — NITROFURANTOIN MACROCRYSTALS SCH MG: 50 CAPSULE ORAL at 12:14

## 2017-12-10 RX ADMIN — Medication SCH MG: at 21:32

## 2017-12-10 RX ADMIN — TRAZODONE HYDROCHLORIDE SCH MG: 100 TABLET ORAL at 21:32

## 2017-12-10 RX ADMIN — QUETIAPINE FUMARATE SCH MG: 100 TABLET ORAL at 21:32

## 2017-12-10 RX ADMIN — NITROFURANTOIN MACROCRYSTALS SCH MG: 50 CAPSULE ORAL at 07:06

## 2017-12-10 RX ADMIN — NITROFURANTOIN MACROCRYSTALS SCH MG: 50 CAPSULE ORAL at 18:22

## 2017-12-10 RX ADMIN — BUDESONIDE AND FORMOTEROL FUMARATE DIHYDRATE SCH PUFF: 80; 4.5 AEROSOL RESPIRATORY (INHALATION) at 10:06

## 2017-12-10 RX ADMIN — FUROSEMIDE SCH MG: 20 TABLET ORAL at 10:05

## 2017-12-10 RX ADMIN — GABAPENTIN SCH MG: 300 CAPSULE ORAL at 21:32

## 2017-12-11 RX ADMIN — NITROFURANTOIN MACROCRYSTALS SCH MG: 50 CAPSULE ORAL at 11:54

## 2017-12-11 RX ADMIN — NICOTINE POLACRILEX PRN MG: 2 GUM, CHEWING ORAL at 10:10

## 2017-12-11 RX ADMIN — METHADONE HYDROCHLORIDE SCH MG: 40 TABLET ORAL at 06:10

## 2017-12-11 RX ADMIN — IBUPROFEN PRN MG: 600 TABLET, FILM COATED ORAL at 21:35

## 2017-12-11 RX ADMIN — PHENOL PRN EACH: 14.5 LOZENGE ORAL at 03:43

## 2017-12-11 RX ADMIN — NITROFURANTOIN MACROCRYSTALS SCH MG: 50 CAPSULE ORAL at 06:10

## 2017-12-11 RX ADMIN — BUDESONIDE AND FORMOTEROL FUMARATE DIHYDRATE SCH PUFF: 80; 4.5 AEROSOL RESPIRATORY (INHALATION) at 21:38

## 2017-12-11 RX ADMIN — Medication SCH MG: at 21:34

## 2017-12-11 RX ADMIN — IBUPROFEN PRN MG: 600 TABLET, FILM COATED ORAL at 06:11

## 2017-12-11 RX ADMIN — FUROSEMIDE SCH MG: 20 TABLET ORAL at 10:06

## 2017-12-11 RX ADMIN — SPIRONOLACTONE SCH MG: 25 TABLET, FILM COATED ORAL at 10:06

## 2017-12-11 RX ADMIN — NICOTINE SCH MG: 21 PATCH TRANSDERMAL at 10:07

## 2017-12-11 RX ADMIN — QUETIAPINE FUMARATE SCH MG: 100 TABLET ORAL at 21:34

## 2017-12-11 RX ADMIN — GABAPENTIN SCH MG: 300 CAPSULE ORAL at 13:01

## 2017-12-11 RX ADMIN — Medication SCH APPLIC: at 10:08

## 2017-12-11 RX ADMIN — MICONAZOLE NITRATE SCH SUPP: 100 SUPPOSITORY VAGINAL at 21:36

## 2017-12-11 RX ADMIN — CITALOPRAM HYDROBROMIDE SCH MG: 20 TABLET ORAL at 10:06

## 2017-12-11 RX ADMIN — ALBUTEROL SULFATE PRN PUFF: 90 AEROSOL, METERED RESPIRATORY (INHALATION) at 14:25

## 2017-12-11 RX ADMIN — HYDROXYZINE PAMOATE PRN MG: 50 CAPSULE ORAL at 13:03

## 2017-12-11 RX ADMIN — NITROFURANTOIN MACROCRYSTALS SCH MG: 50 CAPSULE ORAL at 00:15

## 2017-12-11 RX ADMIN — PANTOPRAZOLE SODIUM SCH MG: 40 TABLET, DELAYED RELEASE ORAL at 06:11

## 2017-12-11 RX ADMIN — Medication SCH TAB: at 10:06

## 2017-12-11 RX ADMIN — GABAPENTIN SCH MG: 300 CAPSULE ORAL at 06:10

## 2017-12-11 RX ADMIN — BUDESONIDE AND FORMOTEROL FUMARATE DIHYDRATE SCH PUFF: 80; 4.5 AEROSOL RESPIRATORY (INHALATION) at 10:06

## 2017-12-11 RX ADMIN — Medication SCH APPLIC: at 21:37

## 2017-12-11 RX ADMIN — DOCUSATE SODIUM SCH MG: 100 CAPSULE, LIQUID FILLED ORAL at 21:35

## 2017-12-11 RX ADMIN — NITROFURANTOIN MACROCRYSTALS SCH MG: 50 CAPSULE ORAL at 23:36

## 2017-12-11 RX ADMIN — SPIRONOLACTONE SCH MG: 25 TABLET, FILM COATED ORAL at 17:10

## 2017-12-11 RX ADMIN — GABAPENTIN SCH MG: 300 CAPSULE ORAL at 21:34

## 2017-12-11 RX ADMIN — NICOTINE POLACRILEX PRN MG: 2 GUM, CHEWING ORAL at 21:37

## 2017-12-11 RX ADMIN — NITROFURANTOIN MACROCRYSTALS SCH MG: 50 CAPSULE ORAL at 17:10

## 2017-12-11 RX ADMIN — TRAZODONE HYDROCHLORIDE SCH MG: 100 TABLET ORAL at 21:34

## 2017-12-12 RX ADMIN — FUROSEMIDE SCH MG: 20 TABLET ORAL at 10:10

## 2017-12-12 RX ADMIN — HYDROXYZINE PAMOATE PRN MG: 50 CAPSULE ORAL at 13:15

## 2017-12-12 RX ADMIN — METHADONE HYDROCHLORIDE SCH MG: 40 TABLET ORAL at 06:24

## 2017-12-12 RX ADMIN — GABAPENTIN SCH MG: 300 CAPSULE ORAL at 21:32

## 2017-12-12 RX ADMIN — PHENOL PRN EACH: 14.5 LOZENGE ORAL at 06:26

## 2017-12-12 RX ADMIN — IBUPROFEN PRN MG: 600 TABLET, FILM COATED ORAL at 10:12

## 2017-12-12 RX ADMIN — Medication SCH MG: at 21:32

## 2017-12-12 RX ADMIN — Medication SCH TAB: at 10:10

## 2017-12-12 RX ADMIN — BUDESONIDE AND FORMOTEROL FUMARATE DIHYDRATE SCH PUFF: 80; 4.5 AEROSOL RESPIRATORY (INHALATION) at 21:32

## 2017-12-12 RX ADMIN — PANTOPRAZOLE SODIUM SCH MG: 40 TABLET, DELAYED RELEASE ORAL at 06:25

## 2017-12-12 RX ADMIN — NITROFURANTOIN MACROCRYSTALS SCH MG: 50 CAPSULE ORAL at 23:14

## 2017-12-12 RX ADMIN — IBUPROFEN PRN MG: 600 TABLET, FILM COATED ORAL at 23:16

## 2017-12-12 RX ADMIN — CITALOPRAM HYDROBROMIDE SCH MG: 20 TABLET ORAL at 10:10

## 2017-12-12 RX ADMIN — TRAZODONE HYDROCHLORIDE SCH MG: 100 TABLET ORAL at 21:31

## 2017-12-12 RX ADMIN — NICOTINE SCH MG: 21 PATCH TRANSDERMAL at 10:11

## 2017-12-12 RX ADMIN — GABAPENTIN SCH MG: 300 CAPSULE ORAL at 06:25

## 2017-12-12 RX ADMIN — SPIRONOLACTONE SCH MG: 25 TABLET, FILM COATED ORAL at 10:10

## 2017-12-12 RX ADMIN — NITROFURANTOIN MACROCRYSTALS SCH MG: 50 CAPSULE ORAL at 18:15

## 2017-12-12 RX ADMIN — GABAPENTIN SCH MG: 300 CAPSULE ORAL at 13:14

## 2017-12-12 RX ADMIN — SPIRONOLACTONE SCH MG: 25 TABLET, FILM COATED ORAL at 18:15

## 2017-12-12 RX ADMIN — DOCUSATE SODIUM SCH MG: 100 CAPSULE, LIQUID FILLED ORAL at 21:31

## 2017-12-12 RX ADMIN — Medication SCH APPLIC: at 10:14

## 2017-12-12 RX ADMIN — BUDESONIDE AND FORMOTEROL FUMARATE DIHYDRATE SCH PUFF: 80; 4.5 AEROSOL RESPIRATORY (INHALATION) at 10:10

## 2017-12-12 RX ADMIN — QUETIAPINE FUMARATE SCH MG: 100 TABLET ORAL at 21:32

## 2017-12-12 RX ADMIN — Medication SCH APPLIC: at 21:33

## 2017-12-12 RX ADMIN — METRONIDAZOLE SCH APPLIC: 7.5 GEL VAGINAL at 21:34

## 2017-12-12 RX ADMIN — NITROFURANTOIN MACROCRYSTALS SCH MG: 50 CAPSULE ORAL at 11:58

## 2017-12-12 RX ADMIN — NITROFURANTOIN MACROCRYSTALS SCH MG: 50 CAPSULE ORAL at 06:25

## 2017-12-13 RX ADMIN — FUROSEMIDE SCH MG: 20 TABLET ORAL at 10:23

## 2017-12-13 RX ADMIN — Medication SCH APPLIC: at 10:23

## 2017-12-13 RX ADMIN — BUDESONIDE AND FORMOTEROL FUMARATE DIHYDRATE SCH PUFF: 80; 4.5 AEROSOL RESPIRATORY (INHALATION) at 10:24

## 2017-12-13 RX ADMIN — NITROFURANTOIN MACROCRYSTALS SCH MG: 50 CAPSULE ORAL at 23:39

## 2017-12-13 RX ADMIN — QUETIAPINE FUMARATE SCH MG: 100 TABLET ORAL at 21:33

## 2017-12-13 RX ADMIN — DOCUSATE SODIUM SCH MG: 100 CAPSULE, LIQUID FILLED ORAL at 21:33

## 2017-12-13 RX ADMIN — Medication SCH APPLIC: at 21:35

## 2017-12-13 RX ADMIN — NICOTINE SCH MG: 21 PATCH TRANSDERMAL at 10:24

## 2017-12-13 RX ADMIN — IBUPROFEN PRN MG: 600 TABLET, FILM COATED ORAL at 11:59

## 2017-12-13 RX ADMIN — IPRATROPIUM BROMIDE AND ALBUTEROL SULFATE PRN AMP: .5; 3 SOLUTION RESPIRATORY (INHALATION) at 09:16

## 2017-12-13 RX ADMIN — BUDESONIDE AND FORMOTEROL FUMARATE DIHYDRATE SCH PUFF: 80; 4.5 AEROSOL RESPIRATORY (INHALATION) at 21:38

## 2017-12-13 RX ADMIN — TRAZODONE HYDROCHLORIDE SCH MG: 100 TABLET ORAL at 21:33

## 2017-12-13 RX ADMIN — NITROFURANTOIN MACROCRYSTALS SCH MG: 50 CAPSULE ORAL at 06:26

## 2017-12-13 RX ADMIN — TOLNAFTATE SCH APPLIC: 10 CREAM TOPICAL at 21:38

## 2017-12-13 RX ADMIN — GABAPENTIN SCH MG: 300 CAPSULE ORAL at 21:33

## 2017-12-13 RX ADMIN — ALUMINUM HYDROXIDE, MAGNESIUM HYDROXIDE, AND SIMETHICONE PRN ML: 200; 200; 20 SUSPENSION ORAL at 13:02

## 2017-12-13 RX ADMIN — Medication SCH TAB: at 10:23

## 2017-12-13 RX ADMIN — PANTOPRAZOLE SODIUM SCH MG: 40 TABLET, DELAYED RELEASE ORAL at 06:26

## 2017-12-13 RX ADMIN — METRONIDAZOLE SCH APPLIC: 7.5 GEL VAGINAL at 21:35

## 2017-12-13 RX ADMIN — NITROFURANTOIN MACROCRYSTALS SCH MG: 50 CAPSULE ORAL at 18:50

## 2017-12-13 RX ADMIN — SPIRONOLACTONE SCH MG: 25 TABLET, FILM COATED ORAL at 10:23

## 2017-12-13 RX ADMIN — GABAPENTIN SCH MG: 300 CAPSULE ORAL at 06:26

## 2017-12-13 RX ADMIN — TOLNAFTATE SCH APPLIC: 10 CREAM TOPICAL at 10:25

## 2017-12-13 RX ADMIN — HYDROXYZINE PAMOATE PRN MG: 50 CAPSULE ORAL at 18:51

## 2017-12-13 RX ADMIN — CITALOPRAM HYDROBROMIDE SCH MG: 20 TABLET ORAL at 10:23

## 2017-12-13 RX ADMIN — NITROFURANTOIN MACROCRYSTALS SCH MG: 50 CAPSULE ORAL at 11:58

## 2017-12-13 RX ADMIN — GABAPENTIN SCH MG: 300 CAPSULE ORAL at 15:00

## 2017-12-13 RX ADMIN — Medication SCH MG: at 21:33

## 2017-12-13 RX ADMIN — SPIRONOLACTONE SCH MG: 25 TABLET, FILM COATED ORAL at 18:50

## 2017-12-13 NOTE — PN
[facility]HS Progress Note (SOAP)


Subjective: 





vaginal odor, monistat not effective, started last night on metronidoazole 

topical


Objective: 





12/13/17 09:00


 Vital Signs - 24 hr











  12/13/17 12/13/17 12/13/17





  00:30 03:30 07:09


 


Temperature   98.0 F


 


Pulse Rate   79


 


Respiratory 18 18 18





Rate   


 


Blood Pressure   116/74








 Laboratory Tests











  12/01/17 12/02/17 12/02/17





  23:00 08:20 08:20


 


WBC   7.2 


 


RBC   4.05 


 


Hgb   11.5 


 


Hct   34.4 


 


MCV   84.9 


 


MCH   28.3 


 


MCHC   33.3 


 


RDW   14.1 


 


Plt Count   201 


 


MPV   9.6 


 


Sodium    136


 


Potassium    4.5


 


Chloride    101


 


Carbon Dioxide    29


 


Anion Gap    6 L


 


BUN    20 H


 


Creatinine    0.8


 


Creat Clearance w eGFR    > 60


 


Random Glucose    130 H


 


Calcium    8.4 L


 


Total Bilirubin    0.5


 


AST    77 H


 


ALT    90 H


 


Alkaline Phosphatase    95


 


Total Protein    6.5


 


Albumin    3.1 L


 


Urine Color  Yellow  


 


Urine Appearance  Slcloudy  


 


Urine pH  5.0  


 


Ur Specific [place]  1.011  


 


Urine Protein  Negative  


 


Urine Glucose (UA)  Negative  


 


Urine Ketones  Negative  


 


Urine Blood  Negative  


 


Urine Nitrite  Positive  


 


Urine Bilirubin  Negative  


 


Urine Urobilinogen  Negative  


 


Ur Leukocyte Esterase  2+ H  


 


Urine WBC (Auto)  31  


 


Urine RBC (Auto)  1  


 


Ur Epithelial Cells  Rare  


 


Urine Bacteria  Rare  


 


Urine Mucus  Rare  


 


RPR Titer   














  12/02/17 12/10/17





  08:20 08:00


 


WBC  


 


RBC  


 


Hgb  


 


Hct  


 


MCV  


 


MCH  


 


MCHC  


 


RDW  


 


Plt Count  


 


MPV  


 


Sodium   138


 


Potassium   4.6


 


Chloride   101


 


Carbon Dioxide   31


 


Anion Gap   6 L


 


BUN   20 H


 


Creatinine   1.0  D


 


Creat Clearance w eGFR  


 


Random Glucose   128 H


 


Calcium   9.1


 


Total Bilirubin  


 


AST  


 


ALT  


 


Alkaline Phosphatase  


 


Total Protein  


 


Albumin  


 


Urine Color  


 


Urine Appearance  


 


Urine pH  


 


Ur Specific Gravity  


 


Urine Protein  


 


Urine Glucose (UA)  


 


Urine Ketones  


 


Urine Blood  


 


Urine Nitrite  


 


Urine Bilirubin  


 


Urine Urobilinogen  


 


Ur Leukocyte Esterase  


 


Urine WBC (Auto)  


 


Urine RBC (Auto)  


 


Ur Epithelial Cells  


 


Urine Bacteria  


 


Urine Mucus  


 


RPR Titer  Nonreactive 











Assessment: 





12/13/17 09:01


bacterial vaginosis, cont metrogel, will increae methadone 10mg as patient 

reports cravings

## 2017-12-14 RX ADMIN — FUROSEMIDE SCH MG: 20 TABLET ORAL at 09:40

## 2017-12-14 RX ADMIN — TRAZODONE HYDROCHLORIDE SCH MG: 100 TABLET ORAL at 21:33

## 2017-12-14 RX ADMIN — CITALOPRAM HYDROBROMIDE SCH MG: 20 TABLET ORAL at 09:39

## 2017-12-14 RX ADMIN — BUDESONIDE AND FORMOTEROL FUMARATE DIHYDRATE SCH PUFF: 80; 4.5 AEROSOL RESPIRATORY (INHALATION) at 09:39

## 2017-12-14 RX ADMIN — NITROFURANTOIN MACROCRYSTALS SCH MG: 50 CAPSULE ORAL at 11:54

## 2017-12-14 RX ADMIN — GABAPENTIN SCH MG: 300 CAPSULE ORAL at 06:55

## 2017-12-14 RX ADMIN — NITROFURANTOIN MACROCRYSTALS SCH MG: 50 CAPSULE ORAL at 06:55

## 2017-12-14 RX ADMIN — Medication SCH TAB: at 09:39

## 2017-12-14 RX ADMIN — TOLNAFTATE SCH: 10 CREAM TOPICAL at 09:40

## 2017-12-14 RX ADMIN — Medication SCH MG: at 21:30

## 2017-12-14 RX ADMIN — Medication SCH APPLIC: at 21:33

## 2017-12-14 RX ADMIN — GABAPENTIN SCH MG: 300 CAPSULE ORAL at 21:30

## 2017-12-14 RX ADMIN — METRONIDAZOLE SCH APPLIC: 7.5 GEL VAGINAL at 21:33

## 2017-12-14 RX ADMIN — TOLNAFTATE SCH APPLIC: 10 CREAM TOPICAL at 21:34

## 2017-12-14 RX ADMIN — METHADONE HYDROCHLORIDE SCH MG: 40 TABLET ORAL at 06:54

## 2017-12-14 RX ADMIN — QUETIAPINE FUMARATE SCH MG: 100 TABLET ORAL at 21:34

## 2017-12-14 RX ADMIN — PANTOPRAZOLE SODIUM SCH MG: 40 TABLET, DELAYED RELEASE ORAL at 06:55

## 2017-12-14 RX ADMIN — HYDROXYZINE PAMOATE PRN MG: 50 CAPSULE ORAL at 13:38

## 2017-12-14 RX ADMIN — IBUPROFEN PRN MG: 600 TABLET, FILM COATED ORAL at 21:32

## 2017-12-14 RX ADMIN — Medication SCH APPLIC: at 09:41

## 2017-12-14 RX ADMIN — GABAPENTIN SCH MG: 300 CAPSULE ORAL at 13:37

## 2017-12-14 RX ADMIN — BUDESONIDE AND FORMOTEROL FUMARATE DIHYDRATE SCH PUFF: 80; 4.5 AEROSOL RESPIRATORY (INHALATION) at 21:34

## 2017-12-14 RX ADMIN — NICOTINE SCH MG: 21 PATCH TRANSDERMAL at 09:39

## 2017-12-14 RX ADMIN — DOCUSATE SODIUM SCH MG: 100 CAPSULE, LIQUID FILLED ORAL at 21:32

## 2017-12-14 RX ADMIN — SPIRONOLACTONE SCH MG: 25 TABLET, FILM COATED ORAL at 18:08

## 2017-12-14 RX ADMIN — SPIRONOLACTONE SCH MG: 25 TABLET, FILM COATED ORAL at 09:40

## 2017-12-15 RX ADMIN — IBUPROFEN PRN MG: 600 TABLET, FILM COATED ORAL at 21:42

## 2017-12-15 RX ADMIN — TRAZODONE HYDROCHLORIDE SCH MG: 100 TABLET ORAL at 21:44

## 2017-12-15 RX ADMIN — MAGNESIUM HYDROXIDE PRN ML: 400 SUSPENSION ORAL at 21:43

## 2017-12-15 RX ADMIN — DOCUSATE SODIUM SCH MG: 100 CAPSULE, LIQUID FILLED ORAL at 21:44

## 2017-12-15 RX ADMIN — Medication SCH TAB: at 10:20

## 2017-12-15 RX ADMIN — FUROSEMIDE SCH MG: 20 TABLET ORAL at 10:20

## 2017-12-15 RX ADMIN — NICOTINE SCH MG: 21 PATCH TRANSDERMAL at 10:20

## 2017-12-15 RX ADMIN — SPIRONOLACTONE SCH MG: 25 TABLET, FILM COATED ORAL at 10:20

## 2017-12-15 RX ADMIN — TOLNAFTATE SCH APPLIC: 10 CREAM TOPICAL at 21:48

## 2017-12-15 RX ADMIN — GABAPENTIN SCH MG: 300 CAPSULE ORAL at 06:41

## 2017-12-15 RX ADMIN — SPIRONOLACTONE SCH MG: 25 TABLET, FILM COATED ORAL at 17:26

## 2017-12-15 RX ADMIN — GABAPENTIN SCH MG: 300 CAPSULE ORAL at 21:45

## 2017-12-15 RX ADMIN — Medication SCH APPLIC: at 21:45

## 2017-12-15 RX ADMIN — Medication SCH APPLIC: at 10:21

## 2017-12-15 RX ADMIN — NICOTINE POLACRILEX PRN MG: 2 GUM, CHEWING ORAL at 21:51

## 2017-12-15 RX ADMIN — CITALOPRAM HYDROBROMIDE SCH MG: 20 TABLET ORAL at 10:20

## 2017-12-15 RX ADMIN — TOLNAFTATE SCH APPLIC: 10 CREAM TOPICAL at 10:20

## 2017-12-15 RX ADMIN — BUDESONIDE AND FORMOTEROL FUMARATE DIHYDRATE SCH PUFF: 80; 4.5 AEROSOL RESPIRATORY (INHALATION) at 21:46

## 2017-12-15 RX ADMIN — QUETIAPINE FUMARATE SCH MG: 100 TABLET ORAL at 21:45

## 2017-12-15 RX ADMIN — BUDESONIDE AND FORMOTEROL FUMARATE DIHYDRATE SCH PUFF: 80; 4.5 AEROSOL RESPIRATORY (INHALATION) at 10:20

## 2017-12-15 RX ADMIN — PANTOPRAZOLE SODIUM SCH MG: 40 TABLET, DELAYED RELEASE ORAL at 06:41

## 2017-12-15 RX ADMIN — Medication SCH MG: at 21:42

## 2017-12-15 RX ADMIN — METRONIDAZOLE SCH APPLIC: 7.5 GEL VAGINAL at 21:43

## 2017-12-15 RX ADMIN — METHADONE HYDROCHLORIDE SCH MG: 40 TABLET ORAL at 06:41

## 2017-12-15 RX ADMIN — Medication PRN APPLIC: at 21:47

## 2017-12-16 RX ADMIN — Medication SCH APPLIC: at 10:01

## 2017-12-16 RX ADMIN — Medication PRN APPLIC: at 10:17

## 2017-12-16 RX ADMIN — Medication SCH MG: at 21:30

## 2017-12-16 RX ADMIN — TOLNAFTATE SCH APPLIC: 10 CREAM TOPICAL at 09:59

## 2017-12-16 RX ADMIN — QUETIAPINE FUMARATE SCH MG: 100 TABLET ORAL at 21:32

## 2017-12-16 RX ADMIN — GABAPENTIN SCH MG: 300 CAPSULE ORAL at 21:30

## 2017-12-16 RX ADMIN — HYDROXYZINE PAMOATE PRN MG: 50 CAPSULE ORAL at 15:04

## 2017-12-16 RX ADMIN — GABAPENTIN SCH MG: 300 CAPSULE ORAL at 13:09

## 2017-12-16 RX ADMIN — GABAPENTIN SCH MG: 300 CAPSULE ORAL at 06:31

## 2017-12-16 RX ADMIN — DOCUSATE SODIUM SCH MG: 100 CAPSULE, LIQUID FILLED ORAL at 21:30

## 2017-12-16 RX ADMIN — Medication SCH APPLIC: at 21:36

## 2017-12-16 RX ADMIN — PANTOPRAZOLE SODIUM SCH MG: 40 TABLET, DELAYED RELEASE ORAL at 06:31

## 2017-12-16 RX ADMIN — GABAPENTIN SCH: 300 CAPSULE ORAL at 11:02

## 2017-12-16 RX ADMIN — ALBUTEROL SULFATE PRN PUFF: 90 AEROSOL, METERED RESPIRATORY (INHALATION) at 21:37

## 2017-12-16 RX ADMIN — SPIRONOLACTONE SCH MG: 25 TABLET, FILM COATED ORAL at 17:04

## 2017-12-16 RX ADMIN — CITALOPRAM HYDROBROMIDE SCH MG: 20 TABLET ORAL at 10:00

## 2017-12-16 RX ADMIN — TRAZODONE HYDROCHLORIDE SCH MG: 100 TABLET ORAL at 21:30

## 2017-12-16 RX ADMIN — TOLNAFTATE SCH APPLIC: 10 CREAM TOPICAL at 21:36

## 2017-12-16 RX ADMIN — NICOTINE POLACRILEX PRN MG: 2 GUM, CHEWING ORAL at 13:11

## 2017-12-16 RX ADMIN — Medication SCH TAB: at 09:59

## 2017-12-16 RX ADMIN — SPIRONOLACTONE SCH MG: 25 TABLET, FILM COATED ORAL at 10:00

## 2017-12-16 RX ADMIN — NICOTINE POLACRILEX PRN MG: 2 GUM, CHEWING ORAL at 18:06

## 2017-12-16 RX ADMIN — METHADONE HYDROCHLORIDE SCH MG: 40 TABLET ORAL at 06:31

## 2017-12-16 RX ADMIN — NICOTINE SCH MG: 21 PATCH TRANSDERMAL at 09:58

## 2017-12-16 RX ADMIN — FUROSEMIDE SCH MG: 20 TABLET ORAL at 10:01

## 2017-12-16 RX ADMIN — BUDESONIDE AND FORMOTEROL FUMARATE DIHYDRATE SCH PUFF: 80; 4.5 AEROSOL RESPIRATORY (INHALATION) at 09:58

## 2017-12-16 RX ADMIN — BUDESONIDE AND FORMOTEROL FUMARATE DIHYDRATE SCH: 80; 4.5 AEROSOL RESPIRATORY (INHALATION) at 21:36

## 2017-12-17 RX ADMIN — HYDROXYZINE PAMOATE PRN MG: 50 CAPSULE ORAL at 09:37

## 2017-12-17 RX ADMIN — SPIRONOLACTONE SCH MG: 25 TABLET, FILM COATED ORAL at 16:59

## 2017-12-17 RX ADMIN — TOLNAFTATE SCH: 10 CREAM TOPICAL at 21:31

## 2017-12-17 RX ADMIN — TOLNAFTATE SCH: 10 CREAM TOPICAL at 09:36

## 2017-12-17 RX ADMIN — PANTOPRAZOLE SODIUM SCH MG: 40 TABLET, DELAYED RELEASE ORAL at 06:42

## 2017-12-17 RX ADMIN — BUDESONIDE AND FORMOTEROL FUMARATE DIHYDRATE SCH PUFF: 80; 4.5 AEROSOL RESPIRATORY (INHALATION) at 09:34

## 2017-12-17 RX ADMIN — TRAZODONE HYDROCHLORIDE SCH MG: 100 TABLET ORAL at 21:29

## 2017-12-17 RX ADMIN — Medication SCH TAB: at 09:33

## 2017-12-17 RX ADMIN — NICOTINE SCH MG: 21 PATCH TRANSDERMAL at 09:34

## 2017-12-17 RX ADMIN — METHADONE HYDROCHLORIDE SCH MG: 40 TABLET ORAL at 06:41

## 2017-12-17 RX ADMIN — FUROSEMIDE SCH: 20 TABLET ORAL at 09:36

## 2017-12-17 RX ADMIN — GABAPENTIN SCH MG: 300 CAPSULE ORAL at 21:29

## 2017-12-17 RX ADMIN — GABAPENTIN SCH MG: 300 CAPSULE ORAL at 06:42

## 2017-12-17 RX ADMIN — IBUPROFEN PRN MG: 600 TABLET, FILM COATED ORAL at 14:53

## 2017-12-17 RX ADMIN — SPIRONOLACTONE SCH MG: 25 TABLET, FILM COATED ORAL at 09:33

## 2017-12-17 RX ADMIN — CITALOPRAM HYDROBROMIDE SCH MG: 20 TABLET ORAL at 09:34

## 2017-12-17 RX ADMIN — QUETIAPINE FUMARATE SCH MG: 100 TABLET ORAL at 21:30

## 2017-12-17 RX ADMIN — Medication SCH: at 21:32

## 2017-12-17 RX ADMIN — IBUPROFEN PRN MG: 600 TABLET, FILM COATED ORAL at 21:28

## 2017-12-17 RX ADMIN — DOCUSATE SODIUM SCH MG: 100 CAPSULE, LIQUID FILLED ORAL at 21:28

## 2017-12-17 RX ADMIN — NICOTINE POLACRILEX PRN MG: 2 GUM, CHEWING ORAL at 13:22

## 2017-12-17 RX ADMIN — GABAPENTIN SCH MG: 300 CAPSULE ORAL at 13:20

## 2017-12-17 RX ADMIN — BUDESONIDE AND FORMOTEROL FUMARATE DIHYDRATE SCH PUFF: 80; 4.5 AEROSOL RESPIRATORY (INHALATION) at 21:31

## 2017-12-17 RX ADMIN — NICOTINE POLACRILEX PRN MG: 2 GUM, CHEWING ORAL at 21:30

## 2017-12-17 RX ADMIN — Medication SCH: at 09:35

## 2017-12-17 RX ADMIN — Medication SCH MG: at 21:28

## 2017-12-18 RX ADMIN — SPIRONOLACTONE SCH MG: 25 TABLET, FILM COATED ORAL at 10:04

## 2017-12-18 RX ADMIN — PANTOPRAZOLE SODIUM SCH MG: 40 TABLET, DELAYED RELEASE ORAL at 06:44

## 2017-12-18 RX ADMIN — TOLNAFTATE SCH APPLIC: 10 CREAM TOPICAL at 10:04

## 2017-12-18 RX ADMIN — Medication SCH APPLIC: at 21:24

## 2017-12-18 RX ADMIN — GABAPENTIN SCH MG: 300 CAPSULE ORAL at 06:44

## 2017-12-18 RX ADMIN — Medication SCH APPLIC: at 10:04

## 2017-12-18 RX ADMIN — FUROSEMIDE SCH MG: 20 TABLET ORAL at 10:04

## 2017-12-18 RX ADMIN — SPIRONOLACTONE SCH MG: 25 TABLET, FILM COATED ORAL at 17:45

## 2017-12-18 RX ADMIN — GABAPENTIN SCH MG: 300 CAPSULE ORAL at 21:22

## 2017-12-18 RX ADMIN — HYDROXYZINE PAMOATE PRN MG: 50 CAPSULE ORAL at 10:07

## 2017-12-18 RX ADMIN — TOLNAFTATE SCH APPLIC: 10 CREAM TOPICAL at 21:23

## 2017-12-18 RX ADMIN — CITALOPRAM HYDROBROMIDE SCH MG: 20 TABLET ORAL at 10:04

## 2017-12-18 RX ADMIN — METHADONE HYDROCHLORIDE SCH MG: 40 TABLET ORAL at 06:43

## 2017-12-18 RX ADMIN — QUETIAPINE FUMARATE SCH MG: 100 TABLET ORAL at 21:24

## 2017-12-18 RX ADMIN — BUDESONIDE AND FORMOTEROL FUMARATE DIHYDRATE SCH PUFF: 80; 4.5 AEROSOL RESPIRATORY (INHALATION) at 21:23

## 2017-12-18 RX ADMIN — GABAPENTIN SCH MG: 300 CAPSULE ORAL at 13:35

## 2017-12-18 RX ADMIN — BUDESONIDE AND FORMOTEROL FUMARATE DIHYDRATE SCH PUFF: 80; 4.5 AEROSOL RESPIRATORY (INHALATION) at 10:03

## 2017-12-18 RX ADMIN — TRAZODONE HYDROCHLORIDE SCH MG: 100 TABLET ORAL at 21:22

## 2017-12-18 RX ADMIN — Medication PRN APPLIC: at 07:04

## 2017-12-18 RX ADMIN — Medication SCH TAB: at 10:04

## 2017-12-18 RX ADMIN — IBUPROFEN PRN MG: 600 TABLET, FILM COATED ORAL at 21:22

## 2017-12-18 RX ADMIN — ALBUTEROL SULFATE PRN PUFF: 90 AEROSOL, METERED RESPIRATORY (INHALATION) at 10:33

## 2017-12-18 RX ADMIN — NICOTINE SCH MG: 21 PATCH TRANSDERMAL at 10:04

## 2017-12-18 RX ADMIN — Medication SCH MG: at 21:23

## 2017-12-18 RX ADMIN — DOCUSATE SODIUM SCH MG: 100 CAPSULE, LIQUID FILLED ORAL at 21:22

## 2017-12-19 RX ADMIN — Medication SCH: at 10:24

## 2017-12-19 RX ADMIN — TOLNAFTATE SCH APPLIC: 10 CREAM TOPICAL at 21:37

## 2017-12-19 RX ADMIN — QUETIAPINE FUMARATE SCH MG: 100 TABLET ORAL at 21:37

## 2017-12-19 RX ADMIN — NICOTINE POLACRILEX PRN MG: 2 GUM, CHEWING ORAL at 10:25

## 2017-12-19 RX ADMIN — IBUPROFEN PRN MG: 600 TABLET, FILM COATED ORAL at 21:39

## 2017-12-19 RX ADMIN — SPIRONOLACTONE SCH MG: 25 TABLET, FILM COATED ORAL at 17:37

## 2017-12-19 RX ADMIN — TOLNAFTATE SCH APPLIC: 10 CREAM TOPICAL at 10:23

## 2017-12-19 RX ADMIN — NICOTINE SCH MG: 21 PATCH TRANSDERMAL at 10:24

## 2017-12-19 RX ADMIN — DOCUSATE SODIUM SCH MG: 100 CAPSULE, LIQUID FILLED ORAL at 21:35

## 2017-12-19 RX ADMIN — GABAPENTIN SCH MG: 300 CAPSULE ORAL at 21:36

## 2017-12-19 RX ADMIN — SPIRONOLACTONE SCH MG: 25 TABLET, FILM COATED ORAL at 10:23

## 2017-12-19 RX ADMIN — Medication SCH MG: at 21:35

## 2017-12-19 RX ADMIN — PANTOPRAZOLE SODIUM SCH MG: 40 TABLET, DELAYED RELEASE ORAL at 06:10

## 2017-12-19 RX ADMIN — Medication SCH TAB: at 10:23

## 2017-12-19 RX ADMIN — METHADONE HYDROCHLORIDE SCH MG: 40 TABLET ORAL at 06:10

## 2017-12-19 RX ADMIN — BUDESONIDE AND FORMOTEROL FUMARATE DIHYDRATE SCH PUFF: 80; 4.5 AEROSOL RESPIRATORY (INHALATION) at 21:38

## 2017-12-19 RX ADMIN — GABAPENTIN SCH MG: 300 CAPSULE ORAL at 06:10

## 2017-12-19 RX ADMIN — CITALOPRAM HYDROBROMIDE SCH MG: 20 TABLET ORAL at 10:23

## 2017-12-19 RX ADMIN — GABAPENTIN SCH MG: 300 CAPSULE ORAL at 13:08

## 2017-12-19 RX ADMIN — FUROSEMIDE SCH MG: 20 TABLET ORAL at 10:23

## 2017-12-19 RX ADMIN — TRAZODONE HYDROCHLORIDE SCH MG: 100 TABLET ORAL at 21:36

## 2017-12-19 RX ADMIN — Medication SCH APPLIC: at 21:36

## 2017-12-19 RX ADMIN — BUDESONIDE AND FORMOTEROL FUMARATE DIHYDRATE SCH PUFF: 80; 4.5 AEROSOL RESPIRATORY (INHALATION) at 10:24

## 2017-12-20 RX ADMIN — HYDROXYZINE PAMOATE PRN MG: 50 CAPSULE ORAL at 10:02

## 2017-12-20 RX ADMIN — NICOTINE SCH MG: 21 PATCH TRANSDERMAL at 09:59

## 2017-12-20 RX ADMIN — Medication SCH TAB: at 09:59

## 2017-12-20 RX ADMIN — GABAPENTIN SCH MG: 300 CAPSULE ORAL at 06:22

## 2017-12-20 RX ADMIN — TOLNAFTATE SCH APPLIC: 10 CREAM TOPICAL at 10:00

## 2017-12-20 RX ADMIN — TOLNAFTATE SCH APPLIC: 10 CREAM TOPICAL at 21:23

## 2017-12-20 RX ADMIN — QUETIAPINE FUMARATE SCH MG: 100 TABLET ORAL at 21:22

## 2017-12-20 RX ADMIN — BUDESONIDE AND FORMOTEROL FUMARATE DIHYDRATE SCH PUFF: 80; 4.5 AEROSOL RESPIRATORY (INHALATION) at 09:59

## 2017-12-20 RX ADMIN — FUROSEMIDE SCH MG: 20 TABLET ORAL at 10:00

## 2017-12-20 RX ADMIN — DOCUSATE SODIUM SCH MG: 100 CAPSULE, LIQUID FILLED ORAL at 21:20

## 2017-12-20 RX ADMIN — GABAPENTIN SCH MG: 300 CAPSULE ORAL at 13:05

## 2017-12-20 RX ADMIN — SPIRONOLACTONE SCH MG: 25 TABLET, FILM COATED ORAL at 19:00

## 2017-12-20 RX ADMIN — Medication SCH APPLIC: at 10:00

## 2017-12-20 RX ADMIN — GABAPENTIN SCH MG: 300 CAPSULE ORAL at 21:20

## 2017-12-20 RX ADMIN — NICOTINE POLACRILEX PRN MG: 2 GUM, CHEWING ORAL at 21:22

## 2017-12-20 RX ADMIN — Medication SCH MG: at 21:19

## 2017-12-20 RX ADMIN — TRAZODONE HYDROCHLORIDE SCH MG: 100 TABLET ORAL at 21:21

## 2017-12-20 RX ADMIN — Medication SCH: at 21:22

## 2017-12-20 RX ADMIN — SPIRONOLACTONE SCH MG: 25 TABLET, FILM COATED ORAL at 10:00

## 2017-12-20 RX ADMIN — METHADONE HYDROCHLORIDE SCH MG: 40 TABLET ORAL at 06:21

## 2017-12-20 RX ADMIN — BUDESONIDE AND FORMOTEROL FUMARATE DIHYDRATE SCH PUFF: 80; 4.5 AEROSOL RESPIRATORY (INHALATION) at 21:23

## 2017-12-20 RX ADMIN — PANTOPRAZOLE SODIUM SCH MG: 40 TABLET, DELAYED RELEASE ORAL at 06:23

## 2017-12-20 RX ADMIN — CITALOPRAM HYDROBROMIDE SCH MG: 20 TABLET ORAL at 10:00

## 2017-12-21 RX ADMIN — GABAPENTIN SCH MG: 300 CAPSULE ORAL at 13:45

## 2017-12-21 RX ADMIN — Medication SCH: at 11:46

## 2017-12-21 RX ADMIN — IBUPROFEN PRN MG: 600 TABLET, FILM COATED ORAL at 21:33

## 2017-12-21 RX ADMIN — SPIRONOLACTONE SCH MG: 25 TABLET, FILM COATED ORAL at 10:43

## 2017-12-21 RX ADMIN — METRONIDAZOLE SCH APPLIC: 7.5 GEL VAGINAL at 21:37

## 2017-12-21 RX ADMIN — CITALOPRAM HYDROBROMIDE SCH MG: 20 TABLET ORAL at 10:59

## 2017-12-21 RX ADMIN — TOLNAFTATE SCH APPLIC: 10 CREAM TOPICAL at 11:06

## 2017-12-21 RX ADMIN — Medication SCH TAB: at 10:59

## 2017-12-21 RX ADMIN — GABAPENTIN SCH MG: 300 CAPSULE ORAL at 21:34

## 2017-12-21 RX ADMIN — FUROSEMIDE SCH: 20 TABLET ORAL at 13:44

## 2017-12-21 RX ADMIN — METHADONE HYDROCHLORIDE SCH MG: 40 TABLET ORAL at 05:59

## 2017-12-21 RX ADMIN — HYDROXYZINE PAMOATE PRN MG: 50 CAPSULE ORAL at 11:01

## 2017-12-21 RX ADMIN — Medication SCH APPLIC: at 21:36

## 2017-12-21 RX ADMIN — MAGNESIUM HYDROXIDE PRN ML: 400 SUSPENSION ORAL at 21:33

## 2017-12-21 RX ADMIN — NICOTINE POLACRILEX PRN MG: 2 GUM, CHEWING ORAL at 11:04

## 2017-12-21 RX ADMIN — TRAZODONE HYDROCHLORIDE SCH MG: 100 TABLET ORAL at 21:34

## 2017-12-21 RX ADMIN — BUDESONIDE AND FORMOTEROL FUMARATE DIHYDRATE SCH PUFF: 80; 4.5 AEROSOL RESPIRATORY (INHALATION) at 11:10

## 2017-12-21 RX ADMIN — SPIRONOLACTONE SCH MG: 25 TABLET, FILM COATED ORAL at 17:13

## 2017-12-21 RX ADMIN — QUETIAPINE FUMARATE SCH MG: 100 TABLET ORAL at 21:33

## 2017-12-21 RX ADMIN — Medication SCH MG: at 21:40

## 2017-12-21 RX ADMIN — ALBUTEROL SULFATE PRN PUFF: 90 AEROSOL, METERED RESPIRATORY (INHALATION) at 06:02

## 2017-12-21 RX ADMIN — BUDESONIDE AND FORMOTEROL FUMARATE DIHYDRATE SCH PUFF: 80; 4.5 AEROSOL RESPIRATORY (INHALATION) at 21:38

## 2017-12-21 RX ADMIN — PANTOPRAZOLE SODIUM SCH MG: 40 TABLET, DELAYED RELEASE ORAL at 05:59

## 2017-12-21 RX ADMIN — GABAPENTIN SCH MG: 300 CAPSULE ORAL at 05:59

## 2017-12-21 RX ADMIN — NICOTINE SCH MG: 21 PATCH TRANSDERMAL at 10:59

## 2017-12-21 RX ADMIN — DOCUSATE SODIUM SCH MG: 100 CAPSULE, LIQUID FILLED ORAL at 21:34

## 2017-12-21 RX ADMIN — TOLNAFTATE SCH: 10 CREAM TOPICAL at 23:27

## 2017-12-21 NOTE — PN
Progress Note (short form)





- Note


Progress Note: 





c/o foul smelling thick white vaginal discharge with intermittent scant blood.


was treated for yeast infection with metronidazole vaginal gel for 4 days, says 

it helped when she used it, the discharge returned when the medication was 

stopped.


denies itching, dysuria, hematuria, fever





Plan


continue metronidazole 0.75% TP for 5 days for yeast infection


hold aldactone 25mg 10AM dose, continue with lasix AM dose due to /67, HR 

86 at 11:20am.





Discussed with Dr. Alonso





Problem List





- Problems


(1) Vaginal infection


Code(s): N76.0 - ACUTE VAGINITIS   





(2) COPD (chronic obstructive pulmonary disease)


Code(s): J44.9 - CHRONIC OBSTRUCTIVE PULMONARY DISEASE, UNSPECIFIED   





(3) Cocaine dependence


Code(s): F14.20 - COCAINE DEPENDENCE, UNCOMPLICATED   





(4) GERD (gastroesophageal reflux disease)


Code(s): K21.9 - GASTRO-ESOPHAGEAL REFLUX DISEASE WITHOUT ESOPHAGITIS   





(5) Nicotine dependence


Code(s): F17.200 - NICOTINE DEPENDENCE, UNSPECIFIED, UNCOMPLICATED   





(6) Opioid dependence on agonist therapy


Code(s): F11.20 - OPIOID DEPENDENCE, UNCOMPLICATED

## 2017-12-22 RX ADMIN — TOLNAFTATE SCH APPLIC: 10 CREAM TOPICAL at 21:35

## 2017-12-22 RX ADMIN — QUETIAPINE FUMARATE SCH MG: 100 TABLET ORAL at 21:35

## 2017-12-22 RX ADMIN — GABAPENTIN SCH MG: 300 CAPSULE ORAL at 13:12

## 2017-12-22 RX ADMIN — DOCUSATE SODIUM SCH MG: 100 CAPSULE, LIQUID FILLED ORAL at 21:36

## 2017-12-22 RX ADMIN — Medication SCH MG: at 21:35

## 2017-12-22 RX ADMIN — TRAZODONE HYDROCHLORIDE SCH MG: 100 TABLET ORAL at 21:36

## 2017-12-22 RX ADMIN — SPIRONOLACTONE SCH MG: 25 TABLET, FILM COATED ORAL at 10:22

## 2017-12-22 RX ADMIN — METRONIDAZOLE SCH APPLIC: 7.5 GEL VAGINAL at 21:46

## 2017-12-22 RX ADMIN — GABAPENTIN SCH MG: 300 CAPSULE ORAL at 06:57

## 2017-12-22 RX ADMIN — BUDESONIDE AND FORMOTEROL FUMARATE DIHYDRATE SCH PUFF: 80; 4.5 AEROSOL RESPIRATORY (INHALATION) at 10:22

## 2017-12-22 RX ADMIN — SPIRONOLACTONE SCH MG: 25 TABLET, FILM COATED ORAL at 17:58

## 2017-12-22 RX ADMIN — TOLNAFTATE SCH APPLIC: 10 CREAM TOPICAL at 10:22

## 2017-12-22 RX ADMIN — METHADONE HYDROCHLORIDE SCH MG: 40 TABLET ORAL at 06:56

## 2017-12-22 RX ADMIN — NICOTINE SCH MG: 21 PATCH TRANSDERMAL at 10:22

## 2017-12-22 RX ADMIN — BUDESONIDE AND FORMOTEROL FUMARATE DIHYDRATE SCH PUFF: 80; 4.5 AEROSOL RESPIRATORY (INHALATION) at 21:34

## 2017-12-22 RX ADMIN — Medication SCH TAB: at 10:22

## 2017-12-22 RX ADMIN — PANTOPRAZOLE SODIUM SCH MG: 40 TABLET, DELAYED RELEASE ORAL at 06:57

## 2017-12-22 RX ADMIN — Medication SCH APPLIC: at 10:22

## 2017-12-22 RX ADMIN — CITALOPRAM HYDROBROMIDE SCH MG: 20 TABLET ORAL at 10:22

## 2017-12-22 RX ADMIN — FUROSEMIDE SCH MG: 20 TABLET ORAL at 10:22

## 2017-12-22 RX ADMIN — Medication SCH APPLIC: at 21:35

## 2017-12-22 RX ADMIN — GABAPENTIN SCH MG: 300 CAPSULE ORAL at 21:35

## 2017-12-23 RX ADMIN — SPIRONOLACTONE SCH MG: 25 TABLET, FILM COATED ORAL at 19:01

## 2017-12-23 RX ADMIN — CITALOPRAM HYDROBROMIDE SCH MG: 20 TABLET ORAL at 09:41

## 2017-12-23 RX ADMIN — METRONIDAZOLE SCH APPLIC: 7.5 GEL VAGINAL at 21:26

## 2017-12-23 RX ADMIN — GABAPENTIN SCH MG: 300 CAPSULE ORAL at 21:23

## 2017-12-23 RX ADMIN — GABAPENTIN SCH MG: 300 CAPSULE ORAL at 13:31

## 2017-12-23 RX ADMIN — SPIRONOLACTONE SCH MG: 25 TABLET, FILM COATED ORAL at 09:41

## 2017-12-23 RX ADMIN — IPRATROPIUM BROMIDE AND ALBUTEROL SULFATE SCH: .5; 3 SOLUTION RESPIRATORY (INHALATION) at 21:25

## 2017-12-23 RX ADMIN — DOCUSATE SODIUM SCH MG: 100 CAPSULE, LIQUID FILLED ORAL at 21:24

## 2017-12-23 RX ADMIN — Medication SCH APPLIC: at 09:41

## 2017-12-23 RX ADMIN — Medication SCH MG: at 21:23

## 2017-12-23 RX ADMIN — PANTOPRAZOLE SODIUM SCH MG: 40 TABLET, DELAYED RELEASE ORAL at 06:51

## 2017-12-23 RX ADMIN — METHADONE HYDROCHLORIDE SCH MG: 40 TABLET ORAL at 06:51

## 2017-12-23 RX ADMIN — TRAZODONE HYDROCHLORIDE SCH MG: 100 TABLET ORAL at 21:23

## 2017-12-23 RX ADMIN — NICOTINE SCH MG: 21 PATCH TRANSDERMAL at 09:41

## 2017-12-23 RX ADMIN — TOLNAFTATE SCH APPLIC: 10 CREAM TOPICAL at 21:26

## 2017-12-23 RX ADMIN — TOLNAFTATE SCH APPLIC: 10 CREAM TOPICAL at 09:41

## 2017-12-23 RX ADMIN — HYDROXYZINE PAMOATE PRN MG: 50 CAPSULE ORAL at 13:32

## 2017-12-23 RX ADMIN — Medication SCH TAB: at 09:41

## 2017-12-23 RX ADMIN — Medication SCH APPLIC: at 21:26

## 2017-12-23 RX ADMIN — QUETIAPINE FUMARATE SCH MG: 100 TABLET ORAL at 21:24

## 2017-12-23 RX ADMIN — IPRATROPIUM BROMIDE AND ALBUTEROL SULFATE SCH: .5; 3 SOLUTION RESPIRATORY (INHALATION) at 18:25

## 2017-12-23 RX ADMIN — BUDESONIDE AND FORMOTEROL FUMARATE DIHYDRATE SCH PUFF: 80; 4.5 AEROSOL RESPIRATORY (INHALATION) at 09:41

## 2017-12-23 RX ADMIN — BUDESONIDE AND FORMOTEROL FUMARATE DIHYDRATE SCH: 80; 4.5 AEROSOL RESPIRATORY (INHALATION) at 21:26

## 2017-12-23 RX ADMIN — FUROSEMIDE SCH MG: 20 TABLET ORAL at 09:41

## 2017-12-23 RX ADMIN — IPRATROPIUM BROMIDE AND ALBUTEROL SULFATE SCH AMP: .5; 3 SOLUTION RESPIRATORY (INHALATION) at 13:32

## 2017-12-23 RX ADMIN — GABAPENTIN SCH MG: 300 CAPSULE ORAL at 06:51

## 2017-12-24 RX ADMIN — Medication SCH: at 09:57

## 2017-12-24 RX ADMIN — IBUPROFEN PRN MG: 600 TABLET, FILM COATED ORAL at 19:27

## 2017-12-24 RX ADMIN — TOLNAFTATE SCH APPLIC: 10 CREAM TOPICAL at 09:59

## 2017-12-24 RX ADMIN — PANTOPRAZOLE SODIUM SCH MG: 40 TABLET, DELAYED RELEASE ORAL at 06:34

## 2017-12-24 RX ADMIN — Medication SCH APPLIC: at 09:57

## 2017-12-24 RX ADMIN — TOLNAFTATE SCH: 10 CREAM TOPICAL at 23:41

## 2017-12-24 RX ADMIN — GABAPENTIN SCH MG: 300 CAPSULE ORAL at 06:34

## 2017-12-24 RX ADMIN — BUDESONIDE AND FORMOTEROL FUMARATE DIHYDRATE SCH PUFF: 80; 4.5 AEROSOL RESPIRATORY (INHALATION) at 09:57

## 2017-12-24 RX ADMIN — IPRATROPIUM BROMIDE AND ALBUTEROL SULFATE SCH: .5; 3 SOLUTION RESPIRATORY (INHALATION) at 18:31

## 2017-12-24 RX ADMIN — Medication SCH MG: at 21:32

## 2017-12-24 RX ADMIN — METHADONE HYDROCHLORIDE SCH MG: 40 TABLET ORAL at 06:35

## 2017-12-24 RX ADMIN — SPIRONOLACTONE SCH MG: 25 TABLET, FILM COATED ORAL at 09:57

## 2017-12-24 RX ADMIN — METRONIDAZOLE SCH APPLIC: 7.5 GEL VAGINAL at 21:33

## 2017-12-24 RX ADMIN — TRAZODONE HYDROCHLORIDE SCH MG: 100 TABLET ORAL at 21:32

## 2017-12-24 RX ADMIN — HYDROXYZINE PAMOATE PRN MG: 50 CAPSULE ORAL at 13:33

## 2017-12-24 RX ADMIN — NICOTINE SCH MG: 21 PATCH TRANSDERMAL at 09:58

## 2017-12-24 RX ADMIN — SPIRONOLACTONE SCH MG: 25 TABLET, FILM COATED ORAL at 18:30

## 2017-12-24 RX ADMIN — CITALOPRAM HYDROBROMIDE SCH MG: 20 TABLET ORAL at 09:57

## 2017-12-24 RX ADMIN — QUETIAPINE FUMARATE SCH MG: 100 TABLET ORAL at 21:32

## 2017-12-24 RX ADMIN — GABAPENTIN SCH MG: 300 CAPSULE ORAL at 21:32

## 2017-12-24 RX ADMIN — GABAPENTIN SCH MG: 300 CAPSULE ORAL at 13:32

## 2017-12-24 RX ADMIN — IPRATROPIUM BROMIDE AND ALBUTEROL SULFATE SCH: .5; 3 SOLUTION RESPIRATORY (INHALATION) at 21:34

## 2017-12-24 RX ADMIN — Medication SCH: at 23:40

## 2017-12-24 RX ADMIN — DOCUSATE SODIUM SCH MG: 100 CAPSULE, LIQUID FILLED ORAL at 21:32

## 2017-12-24 RX ADMIN — BUDESONIDE AND FORMOTEROL FUMARATE DIHYDRATE SCH: 80; 4.5 AEROSOL RESPIRATORY (INHALATION) at 21:33

## 2017-12-24 RX ADMIN — FUROSEMIDE SCH MG: 20 TABLET ORAL at 09:57

## 2017-12-24 NOTE — PN
BHS Progress Note


Note: 





C/O chest pain while sitting. no pain on exertion.





VSS





Lungs : Clear A&P


Heart : RR, no murmur





EKG : NSR, normal EKG





Dx. : Non-Specific chest pain





P : observe

## 2017-12-25 RX ADMIN — NICOTINE SCH MG: 21 PATCH TRANSDERMAL at 09:53

## 2017-12-25 RX ADMIN — DOCUSATE SODIUM SCH MG: 100 CAPSULE, LIQUID FILLED ORAL at 21:22

## 2017-12-25 RX ADMIN — TRAZODONE HYDROCHLORIDE SCH MG: 100 TABLET ORAL at 21:22

## 2017-12-25 RX ADMIN — Medication SCH TAB: at 09:52

## 2017-12-25 RX ADMIN — SPIRONOLACTONE SCH MG: 25 TABLET, FILM COATED ORAL at 09:52

## 2017-12-25 RX ADMIN — GABAPENTIN SCH MG: 300 CAPSULE ORAL at 06:28

## 2017-12-25 RX ADMIN — GABAPENTIN SCH MG: 300 CAPSULE ORAL at 13:55

## 2017-12-25 RX ADMIN — Medication SCH MG: at 21:22

## 2017-12-25 RX ADMIN — CITALOPRAM HYDROBROMIDE SCH MG: 20 TABLET ORAL at 09:52

## 2017-12-25 RX ADMIN — TOLNAFTATE SCH: 10 CREAM TOPICAL at 09:56

## 2017-12-25 RX ADMIN — IPRATROPIUM BROMIDE AND ALBUTEROL SULFATE SCH: .5; 3 SOLUTION RESPIRATORY (INHALATION) at 13:55

## 2017-12-25 RX ADMIN — IPRATROPIUM BROMIDE AND ALBUTEROL SULFATE SCH: .5; 3 SOLUTION RESPIRATORY (INHALATION) at 21:23

## 2017-12-25 RX ADMIN — IPRATROPIUM BROMIDE AND ALBUTEROL SULFATE SCH AMP: .5; 3 SOLUTION RESPIRATORY (INHALATION) at 09:54

## 2017-12-25 RX ADMIN — IPRATROPIUM BROMIDE AND ALBUTEROL SULFATE SCH: .5; 3 SOLUTION RESPIRATORY (INHALATION) at 18:10

## 2017-12-25 RX ADMIN — SPIRONOLACTONE SCH: 25 TABLET, FILM COATED ORAL at 17:08

## 2017-12-25 RX ADMIN — FUROSEMIDE SCH MG: 20 TABLET ORAL at 09:52

## 2017-12-25 RX ADMIN — Medication SCH: at 21:23

## 2017-12-25 RX ADMIN — GABAPENTIN SCH MG: 300 CAPSULE ORAL at 21:22

## 2017-12-25 RX ADMIN — Medication SCH APPLIC: at 09:53

## 2017-12-25 RX ADMIN — TOLNAFTATE SCH APPLIC: 10 CREAM TOPICAL at 21:25

## 2017-12-25 RX ADMIN — METRONIDAZOLE SCH APPLIC: 7.5 GEL VAGINAL at 21:25

## 2017-12-25 RX ADMIN — QUETIAPINE FUMARATE SCH MG: 100 TABLET ORAL at 21:22

## 2017-12-25 RX ADMIN — BUDESONIDE AND FORMOTEROL FUMARATE DIHYDRATE SCH PUFF: 80; 4.5 AEROSOL RESPIRATORY (INHALATION) at 09:53

## 2017-12-25 RX ADMIN — BUDESONIDE AND FORMOTEROL FUMARATE DIHYDRATE SCH PUFF: 80; 4.5 AEROSOL RESPIRATORY (INHALATION) at 21:24

## 2017-12-25 RX ADMIN — METHADONE HYDROCHLORIDE SCH MG: 40 TABLET ORAL at 06:28

## 2017-12-25 RX ADMIN — PANTOPRAZOLE SODIUM SCH MG: 40 TABLET, DELAYED RELEASE ORAL at 06:28

## 2017-12-25 NOTE — EKG
Test Reason : 

Blood Pressure : ***/*** mmHG

Vent. Rate : 071 BPM     Atrial Rate : 071 BPM

   P-R Int : 134 ms          QRS Dur : 084 ms

    QT Int : 398 ms       P-R-T Axes : 046 072 066 degrees

   QTc Int : 432 ms

 

NORMAL SINUS RHYTHM

NORMAL ECG

WHEN COMPARED WITH ECG OF 01-DEC-2017 23:12,

NO SIGNIFICANT CHANGE WAS FOUND

Confirmed by NAOMY WEST MD ([id number]) on 12/25/2017 3:27:11 PM

 

Referred By:             Confirmed By:NAOMY WEST MD

## 2017-12-26 LAB
APPEARANCE UR: (no result)
BACTERIA #/AREA URNS HPF: (no result) /HPF
BILIRUB UR STRIP.AUTO-MCNC: NEGATIVE MG/DL
COLOR UR: (no result)
EPITH CASTS URNS QL MICRO: (no result) /HPF
KETONES UR QL STRIP: NEGATIVE
LEUKOCYTE ESTERASE UR QL STRIP.AUTO: (no result)
NITRITE UR QL STRIP: NEGATIVE
PH UR: 6 [PH] (ref 5–8)
PROT UR QL STRIP: NEGATIVE
PROT UR QL STRIP: NEGATIVE
RBC # UR STRIP: (no result) /UL
SP GR UR: 1 (ref 1–1.03)
UROBILINOGEN UR STRIP-MCNC: NEGATIVE MG/DL (ref 0.2–1)
YEAST #/AREA URNS HPF: (no result) /[HPF]

## 2017-12-26 RX ADMIN — DOCUSATE SODIUM SCH MG: 100 CAPSULE, LIQUID FILLED ORAL at 21:28

## 2017-12-26 RX ADMIN — GABAPENTIN SCH MG: 300 CAPSULE ORAL at 06:11

## 2017-12-26 RX ADMIN — CITALOPRAM HYDROBROMIDE SCH MG: 20 TABLET ORAL at 09:48

## 2017-12-26 RX ADMIN — QUETIAPINE FUMARATE SCH MG: 100 TABLET ORAL at 21:28

## 2017-12-26 RX ADMIN — ALBUTEROL SULFATE PRN PUFF: 90 AEROSOL, METERED RESPIRATORY (INHALATION) at 21:30

## 2017-12-26 RX ADMIN — Medication SCH MG: at 21:30

## 2017-12-26 RX ADMIN — SPIRONOLACTONE SCH: 25 TABLET, FILM COATED ORAL at 10:30

## 2017-12-26 RX ADMIN — TOLNAFTATE SCH APPLIC: 10 CREAM TOPICAL at 09:49

## 2017-12-26 RX ADMIN — BUDESONIDE AND FORMOTEROL FUMARATE DIHYDRATE SCH: 80; 4.5 AEROSOL RESPIRATORY (INHALATION) at 21:29

## 2017-12-26 RX ADMIN — TOLNAFTATE SCH APPLIC: 10 CREAM TOPICAL at 21:30

## 2017-12-26 RX ADMIN — SPIRONOLACTONE SCH: 25 TABLET, FILM COATED ORAL at 17:57

## 2017-12-26 RX ADMIN — GABAPENTIN SCH MG: 300 CAPSULE ORAL at 21:29

## 2017-12-26 RX ADMIN — FUROSEMIDE SCH: 20 TABLET ORAL at 10:31

## 2017-12-26 RX ADMIN — Medication SCH: at 10:30

## 2017-12-26 RX ADMIN — GABAPENTIN SCH MG: 300 CAPSULE ORAL at 13:09

## 2017-12-26 RX ADMIN — PANTOPRAZOLE SODIUM SCH MG: 40 TABLET, DELAYED RELEASE ORAL at 06:11

## 2017-12-26 RX ADMIN — NICOTINE SCH MG: 21 PATCH TRANSDERMAL at 09:47

## 2017-12-26 RX ADMIN — IPRATROPIUM BROMIDE AND ALBUTEROL SULFATE SCH: .5; 3 SOLUTION RESPIRATORY (INHALATION) at 10:10

## 2017-12-26 RX ADMIN — Medication SCH TAB: at 09:48

## 2017-12-26 RX ADMIN — BUDESONIDE AND FORMOTEROL FUMARATE DIHYDRATE SCH PUFF: 80; 4.5 AEROSOL RESPIRATORY (INHALATION) at 09:48

## 2017-12-26 RX ADMIN — METHADONE HYDROCHLORIDE SCH MG: 40 TABLET ORAL at 06:11

## 2017-12-26 RX ADMIN — Medication SCH APPLIC: at 21:29

## 2017-12-26 RX ADMIN — TRAZODONE HYDROCHLORIDE SCH MG: 100 TABLET ORAL at 21:28

## 2017-12-26 RX ADMIN — NICOTINE POLACRILEX PRN MG: 2 GUM, CHEWING ORAL at 21:33

## 2017-12-26 NOTE — PN
Progress Note (short form)





- Note


Progress Note: 


no complaints. 


BP has been low while on both aldactone and lasix


 Vital Signs (72 hours)











  12/25/17 12/25/17 12/25/17





  03:30 07:13 10:00


 


Temperature  97.8 F 


 


Pulse Rate  64 91 H


 


Respiratory 18 16 





Rate   


 


Blood Pressure  99/64 117/71














  12/25/17 12/26/17 12/26/17





  15:05 00:30 03:30


 


Temperature   


 


Pulse Rate 81  


 


Respiratory  18 18





Rate   


 


Blood Pressure 100/63  














  12/26/17 12/26/17 12/26/17





  06:47 09:05 12:04


 


Temperature 97.9 F 97.9 F 97.9 F


 


Pulse Rate 83 92 H 73


 


Respiratory 16 16 18





Rate   


 


Blood Pressure 107/74 103/65 112/73

















Plan:


Discontinue the lasix, continue the aldactone with parameters to hold if BP <100

/60





Resident Progress note, Dr. Guajardo, PGY-2


discussed with Dr. Alonso





Problem List





- Problems


(1) Vaginal infection


Code(s): N76.0 - ACUTE VAGINITIS   





(2) COPD (chronic obstructive pulmonary disease)


Code(s): J44.9 - CHRONIC OBSTRUCTIVE PULMONARY DISEASE, UNSPECIFIED   





(3) Cocaine dependence


Code(s): F14.20 - COCAINE DEPENDENCE, UNCOMPLICATED   





(4) GERD (gastroesophageal reflux disease)


Code(s): K21.9 - GASTRO-ESOPHAGEAL REFLUX DISEASE WITHOUT ESOPHAGITIS   





(5) Nicotine dependence


Code(s): F17.200 - NICOTINE DEPENDENCE, UNSPECIFIED, UNCOMPLICATED   





(6) Opioid dependence on agonist therapy


Code(s): F11.20 - OPIOID DEPENDENCE, UNCOMPLICATED

## 2017-12-27 RX ADMIN — SPIRONOLACTONE SCH: 25 TABLET, FILM COATED ORAL at 10:07

## 2017-12-27 RX ADMIN — NICOTINE SCH MG: 21 PATCH TRANSDERMAL at 10:08

## 2017-12-27 RX ADMIN — TOLNAFTATE SCH APPLIC: 10 CREAM TOPICAL at 21:22

## 2017-12-27 RX ADMIN — TOLNAFTATE SCH APPLIC: 10 CREAM TOPICAL at 10:07

## 2017-12-27 RX ADMIN — GABAPENTIN SCH MG: 300 CAPSULE ORAL at 21:21

## 2017-12-27 RX ADMIN — GABAPENTIN SCH MG: 300 CAPSULE ORAL at 13:12

## 2017-12-27 RX ADMIN — IBUPROFEN PRN MG: 600 TABLET, FILM COATED ORAL at 20:09

## 2017-12-27 RX ADMIN — Medication SCH: at 21:24

## 2017-12-27 RX ADMIN — QUETIAPINE FUMARATE SCH MG: 100 TABLET ORAL at 21:22

## 2017-12-27 RX ADMIN — METHADONE HYDROCHLORIDE SCH MG: 40 TABLET ORAL at 06:30

## 2017-12-27 RX ADMIN — SPIRONOLACTONE SCH: 25 TABLET, FILM COATED ORAL at 18:10

## 2017-12-27 RX ADMIN — PANTOPRAZOLE SODIUM SCH MG: 40 TABLET, DELAYED RELEASE ORAL at 06:30

## 2017-12-27 RX ADMIN — NICOTINE POLACRILEX PRN MG: 2 GUM, CHEWING ORAL at 21:25

## 2017-12-27 RX ADMIN — Medication SCH: at 10:08

## 2017-12-27 RX ADMIN — BUDESONIDE AND FORMOTEROL FUMARATE DIHYDRATE SCH PUFF: 80; 4.5 AEROSOL RESPIRATORY (INHALATION) at 10:07

## 2017-12-27 RX ADMIN — Medication SCH: at 10:06

## 2017-12-27 RX ADMIN — DOCUSATE SODIUM SCH MG: 100 CAPSULE, LIQUID FILLED ORAL at 21:21

## 2017-12-27 RX ADMIN — TRAZODONE HYDROCHLORIDE SCH MG: 100 TABLET ORAL at 21:21

## 2017-12-27 RX ADMIN — BUDESONIDE AND FORMOTEROL FUMARATE DIHYDRATE SCH PUFF: 80; 4.5 AEROSOL RESPIRATORY (INHALATION) at 21:21

## 2017-12-27 RX ADMIN — CITALOPRAM HYDROBROMIDE SCH MG: 20 TABLET ORAL at 10:06

## 2017-12-27 RX ADMIN — HYDROXYZINE PAMOATE PRN MG: 50 CAPSULE ORAL at 10:07

## 2017-12-27 RX ADMIN — GABAPENTIN SCH MG: 300 CAPSULE ORAL at 06:29

## 2017-12-27 RX ADMIN — Medication SCH MG: at 21:24

## 2017-12-27 RX ADMIN — HYDROXYZINE PAMOATE PRN MG: 50 CAPSULE ORAL at 20:08

## 2017-12-28 RX ADMIN — NICOTINE SCH MG: 21 PATCH TRANSDERMAL at 10:33

## 2017-12-28 RX ADMIN — TOLNAFTATE SCH APPLIC: 10 CREAM TOPICAL at 10:33

## 2017-12-28 RX ADMIN — Medication SCH: at 21:40

## 2017-12-28 RX ADMIN — METHADONE HYDROCHLORIDE SCH MG: 40 TABLET ORAL at 06:34

## 2017-12-28 RX ADMIN — Medication SCH: at 10:35

## 2017-12-28 RX ADMIN — TOLNAFTATE SCH APPLIC: 10 CREAM TOPICAL at 21:40

## 2017-12-28 RX ADMIN — IBUPROFEN PRN MG: 600 TABLET, FILM COATED ORAL at 02:00

## 2017-12-28 RX ADMIN — PANTOPRAZOLE SODIUM SCH MG: 40 TABLET, DELAYED RELEASE ORAL at 06:33

## 2017-12-28 RX ADMIN — CITALOPRAM HYDROBROMIDE SCH MG: 20 TABLET ORAL at 10:32

## 2017-12-28 RX ADMIN — HYDROXYZINE PAMOATE PRN MG: 50 CAPSULE ORAL at 10:32

## 2017-12-28 RX ADMIN — TRAZODONE HYDROCHLORIDE SCH MG: 100 TABLET ORAL at 21:39

## 2017-12-28 RX ADMIN — ALBUTEROL SULFATE PRN PUFF: 90 AEROSOL, METERED RESPIRATORY (INHALATION) at 21:41

## 2017-12-28 RX ADMIN — GABAPENTIN SCH MG: 300 CAPSULE ORAL at 13:37

## 2017-12-28 RX ADMIN — BUDESONIDE AND FORMOTEROL FUMARATE DIHYDRATE SCH PUFF: 80; 4.5 AEROSOL RESPIRATORY (INHALATION) at 10:33

## 2017-12-28 RX ADMIN — Medication SCH: at 10:32

## 2017-12-28 RX ADMIN — SPIRONOLACTONE SCH MG: 25 TABLET, FILM COATED ORAL at 17:22

## 2017-12-28 RX ADMIN — GABAPENTIN SCH MG: 300 CAPSULE ORAL at 21:39

## 2017-12-28 RX ADMIN — QUETIAPINE FUMARATE SCH MG: 100 TABLET ORAL at 21:39

## 2017-12-28 RX ADMIN — DOCUSATE SODIUM SCH MG: 100 CAPSULE, LIQUID FILLED ORAL at 21:39

## 2017-12-28 RX ADMIN — GABAPENTIN SCH MG: 300 CAPSULE ORAL at 06:33

## 2017-12-28 RX ADMIN — SPIRONOLACTONE SCH: 25 TABLET, FILM COATED ORAL at 10:34

## 2017-12-28 RX ADMIN — HYDROXYZINE PAMOATE PRN MG: 50 CAPSULE ORAL at 18:26

## 2017-12-28 RX ADMIN — MAGNESIUM HYDROXIDE PRN ML: 400 SUSPENSION ORAL at 21:43

## 2017-12-28 RX ADMIN — Medication SCH MG: at 21:39

## 2017-12-28 RX ADMIN — IBUPROFEN PRN MG: 600 TABLET, FILM COATED ORAL at 17:22

## 2017-12-28 RX ADMIN — NICOTINE POLACRILEX PRN MG: 2 GUM, CHEWING ORAL at 10:33

## 2017-12-28 RX ADMIN — BUDESONIDE AND FORMOTEROL FUMARATE DIHYDRATE SCH: 80; 4.5 AEROSOL RESPIRATORY (INHALATION) at 21:40

## 2017-12-29 VITALS — TEMPERATURE: 98 F

## 2017-12-29 VITALS — SYSTOLIC BLOOD PRESSURE: 134 MMHG | DIASTOLIC BLOOD PRESSURE: 85 MMHG | HEART RATE: 96 BPM

## 2017-12-29 RX ADMIN — BUDESONIDE AND FORMOTEROL FUMARATE DIHYDRATE SCH PUFF: 80; 4.5 AEROSOL RESPIRATORY (INHALATION) at 09:44

## 2017-12-29 RX ADMIN — PANTOPRAZOLE SODIUM SCH MG: 40 TABLET, DELAYED RELEASE ORAL at 06:17

## 2017-12-29 RX ADMIN — Medication SCH APPLIC: at 09:45

## 2017-12-29 RX ADMIN — CITALOPRAM HYDROBROMIDE SCH MG: 20 TABLET ORAL at 09:44

## 2017-12-29 RX ADMIN — Medication SCH: at 09:44

## 2017-12-29 RX ADMIN — SPIRONOLACTONE SCH: 25 TABLET, FILM COATED ORAL at 09:44

## 2017-12-29 RX ADMIN — GABAPENTIN SCH MG: 300 CAPSULE ORAL at 06:17

## 2017-12-29 RX ADMIN — TOLNAFTATE SCH APPLIC: 10 CREAM TOPICAL at 09:45

## 2017-12-29 RX ADMIN — METHADONE HYDROCHLORIDE SCH MG: 40 TABLET ORAL at 06:17

## 2017-12-29 RX ADMIN — NICOTINE SCH: 21 PATCH TRANSDERMAL at 09:46

## 2017-12-29 NOTE — PN
Psychiatric Progress Note


Vital Signs: 


 Vital Signs











 Period  Temp  Pulse  Resp  BP Sys/Rosario  Pulse Ox


 


 Last 24 Hr  98.0 F  70-94  18-18  /60-88  











Date of Session: 12/29/17


Chief Complaint:: Discharge visit


HPI: Patient addressed Opioid dpendence comorbid with Bipolar disorder.


ROS: Significant for COPD,Hep C.


Current Medications: 


Active Medications











Generic Name Dose Route Start Last Admin





  Trade Name Freq  PRN Reason Stop Dose Admin


 


Acetaminophen  650 mg  12/01/17 20:14  12/08/17 23:50





  Tylenol -  PO   650 mg





  Q4H PRN   Administration





  PAIN   


 


Al Hydroxide/Mg Hydroxide  30 ml  12/01/17 20:14  12/13/17 13:02





  Mylanta Oral Suspension -  PO   30 ml





  Q6H PRN   Administration





  DYSPEPSIA   


 


Albuterol Sulfate  1 puff  12/26/17 10:19  12/28/17 21:41





  Ventolin Hfa Inhaler -  IH   1 puff





  Q4H PRN   Administration





  ASTHMA   


 


Budesonide/Formoterol Fumarate  1 puff  12/01/17 22:00  12/28/17 21:40





  Symbicort 80/4.5mcg -  IH   Not Given





  BID WES   


 


Citalopram Hydrobromide  40 mg  12/02/17 10:00  12/28/17 10:32





  Celexa -  PO   40 mg





  DAILY WES   Administration


 


Colloidal Oatmeal  1 applic  12/07/17 12:24  12/18/17 07:04





  Aveeno Soap -  TP   1 applic





  DAILY PRN   Administration





  HYGEINE   


 


Docusate Sodium  300 mg  12/08/17 22:00  12/28/17 21:39





  Colace -  PO   300 mg





  HS WES   Administration


 


Eucalyptus/Menthol/Phenol/Sorbitol  1 each  12/01/17 20:14  12/12/17 06:26





  Cepastat Lozenge -  MM   1 each





  Q4H PRN   Administration





  SORE THROAT   


 


Gabapentin  600 mg  12/06/17 14:27  12/29/17 06:17





  Neurontin -  PO   600 mg





  TID WES   Administration


 


Guaifenesin  10 ml  12/01/17 20:14  





  Robitussin Dm -  PO   





  Q6H PRN   





  COUGH   


 


Hydroxyzine Pamoate  50 mg  12/08/17 12:57  12/28/17 18:26





  Vistaril -  PO   50 mg





  Q6H PRN   Administration





  ANXIETY   


 


Ibuprofen  600 mg  12/04/17 10:54  12/28/17 17:22





  Motrin -  PO   600 mg





  Q6H PRN   Administration





  SEVERE PAIN   


 


Loperamide HCl  4 mg  12/01/17 20:14  





  Imodium -  PO   





  Q6H PRN   





  DIARRHEA   


 


Magnesium Citrate  300 ml  12/01/17 20:14  12/16/17 13:12





  Citroma -  PO   300 ml





  Q48H PRN   Administration





  CONSTIPATION   


 


Magnesium Hydroxide  30 ml  12/01/17 20:14  12/28/17 21:43





  Milk Of Magnesia -  PO   30 ml





  DAILY PRN   Administration





  CONSTIPATION   


 


Methadone HCl 40 mg/ Methadone  70 mg  12/27/17 06:00  12/29/17 06:17





  HCl 30 mg  PO  01/03/18 05:59  70 mg





  DAILY@0600 WES   Administration


 


Multi-Ingredient Lotion  1 applic  12/07/17 12:30  12/28/17 21:40





  Eucerin (Small Jar) -  TP   Not Given





  BID WES   


 


Nicotine  21 mg  12/02/17 10:00  12/28/17 10:33





  Nicoderm Patch -  TD   21 mg





  DAILY WES   Administration


 


Nicotine Polacrilex  2 mg  12/01/17 20:14  12/28/17 10:33





  Nicorette Gum -  BC   2 mg





  Q2H PRN   Administration





  NICOTINE REPLACEMENT RX   


 


Pantoprazole Sodium  40 mg  12/05/17 07:00  12/29/17 06:17





  Protonix -  PO   40 mg





  DAILY@0700 WES   Administration


 


Prenatal Multivit/Folic Acid/Iron  1 tab  12/02/17 10:00  12/28/17 10:32





  Prenatal Vitamins (Sjr) -  PO   Not Given





  DAILY WES   


 


Quetiapine Fumarate  100 mg  12/06/17 22:00  12/28/17 21:39





  Seroquel -  PO   100 mg





  HS WES   Administration


 


Spironolactone  25 mg  12/27/17 11:14  12/28/17 17:22





  Aldactone -  PO   25 mg





  BID@1000,1700 WES   Administration


 


Thiamine HCl  100 mg  12/01/17 22:00  12/28/17 21:39





  Vitamin B1 -  PO   100 mg





  HS WES   Administration


 


Tolnaftate  1 applic  12/13/17 10:00  12/28/17 21:40





  Tinactin 1% Cream -  TP   1 applic





  BID WES   Administration


 


Trazodone HCl  100 mg  12/04/17 22:00  12/28/17 21:39





  Desyrel -  PO   100 mg





  HS WES   Administration











Current Side Effect: No


Lab tests ordered: No


Lab tests reviewed: Yes


Provider note:: Patient completed this program today.She has met her treatment 

goals and will continue to address her issues on outpatient basis at [facility].She reports finding that current medications 

including Neurontin 600 mg po tid,Seroquel 100 mg po hs and Trazodone 100 mg po 

hs help to cope with mood instability,depression,insomnia.[facility].  

Supportive therapy provided focusing on relapse prevention.  Patient is stable 

for discharge today.


Total face to face time:: 25





Mental Status Exam





- Mental Status Exam


Alert and Oriented to: Time, Place, Person


Cognitive Function: Grossly Intact


Patient Appearance: Unkempt


Mood: Euthymic


Affect: Mood Congruent


Patient Behavior: Cooperative


Speech Pattern: Clear


Voice Loudness: Normal


Thought Process: Goal Oriented


Thought Disorder: Not Present


Hallucinations: Denies


Suicidal Ideation: Denies


Homicidal Ideation: Denies


Insight/Judgement: Fair


Sleep: Fair


Appetite: Good


Muscle strength/Tone: Normal


Gait/Station: Normal no

## 2019-05-05 ENCOUNTER — EMERGENCY (EMERGENCY)
Facility: HOSPITAL | Age: 59
LOS: 1 days | Discharge: HOME | End: 2019-05-05
Attending: EMERGENCY MEDICINE | Admitting: EMERGENCY MEDICINE
Payer: SELF-PAY

## 2019-05-05 VITALS
OXYGEN SATURATION: 91 % | TEMPERATURE: 98 F | DIASTOLIC BLOOD PRESSURE: 78 MMHG | SYSTOLIC BLOOD PRESSURE: 116 MMHG | RESPIRATION RATE: 18 BRPM | HEART RATE: 94 BPM

## 2019-05-05 PROCEDURE — 99283 EMERGENCY DEPT VISIT LOW MDM: CPT

## 2019-05-05 RX ORDER — ONDANSETRON 8 MG/1
8 TABLET, FILM COATED ORAL ONCE
Qty: 0 | Refills: 0 | Status: COMPLETED | OUTPATIENT
Start: 2019-05-05 | End: 2019-05-05

## 2019-05-05 RX ORDER — ONDANSETRON 8 MG/1
1 TABLET, FILM COATED ORAL
Qty: 15 | Refills: 0 | OUTPATIENT
Start: 2019-05-05 | End: 2019-05-09

## 2019-05-05 RX ADMIN — ONDANSETRON 8 MILLIGRAM(S): 8 TABLET, FILM COATED ORAL at 18:28

## 2019-05-05 NOTE — ED PROVIDER NOTE - CONSTITUTIONAL, MLM
normal... Fair hygiene, well nourished, awake, alert, oriented to person, place, time/situation and in no apparent distress.

## 2019-05-05 NOTE — ED PROVIDER NOTE - CLINICAL SUMMARY MEDICAL DECISION MAKING FREE TEXT BOX
59 y/o F with  PMHx of COPD presents to ED with dizziness and nausea. Suspect mild gastroenteritis. Pt is able to tolerate small PO. Will give PO Pedialyte and oral Zofran. Anticipating discharge with Zofran ODT.

## 2019-05-05 NOTE — ED PROVIDER NOTE - OBJECTIVE STATEMENT
59 y/o F with PMHx of COPD presents to ED with dizziness and nausea for last few days. Pt denies having any fever. She admits to SHx of smoking as well as EtOH use.     Pt states to having allergies for Sulfa and Penicillin.

## 2019-05-05 NOTE — ED ADULT NURSE NOTE - NSIMPLEMENTINTERV_GEN_ALL_ED
Implemented All Universal Safety Interventions:  [place] to call system. Call bell, personal items and telephone within reach. Instruct patient to call for assistance. Room bathroom lighting operational. Non-slip footwear when patient is off stretcher. Physically safe environment: no spills, clutter or unnecessary equipment. Stretcher in lowest position, wheels locked, appropriate side rails in place.

## 2019-05-05 NOTE — ED ADULT TRIAGE NOTE - CHIEF COMPLAINT QUOTE
Nausea and vomiting x 24 hrs, patient accompanied  by 2 other family members with similar complaints , h/o Ashtma, COPD, active smoker

## 2019-05-06 PROBLEM — J44.9 CHRONIC OBSTRUCTIVE PULMONARY DISEASE, UNSPECIFIED: Chronic | Status: ACTIVE | Noted: 2017-07-04

## 2019-05-09 DIAGNOSIS — R42 DIZZINESS AND GIDDINESS: ICD-10-CM

## 2019-05-09 DIAGNOSIS — F17.200 NICOTINE DEPENDENCE, UNSPECIFIED, UNCOMPLICATED: ICD-10-CM

## 2019-05-09 DIAGNOSIS — J44.9 CHRONIC OBSTRUCTIVE PULMONARY DISEASE, UNSPECIFIED: ICD-10-CM

## 2019-05-09 DIAGNOSIS — R11.2 NAUSEA WITH VOMITING, UNSPECIFIED: ICD-10-CM

## 2019-05-09 DIAGNOSIS — Z79.52 LONG TERM (CURRENT) USE OF SYSTEMIC STEROIDS: ICD-10-CM

## 2019-05-09 DIAGNOSIS — Z88.0 ALLERGY STATUS TO PENICILLIN: ICD-10-CM

## 2019-05-09 DIAGNOSIS — Z88.1 ALLERGY STATUS TO OTHER ANTIBIOTIC AGENTS STATUS: ICD-10-CM

## 2019-05-09 DIAGNOSIS — Z88.2 ALLERGY STATUS TO SULFONAMIDES: ICD-10-CM

## 2022-09-27 NOTE — ED ADULT NURSE NOTE - MUSCULOSKELETAL WDL
GOAL: Patient will improve bilateral UE/LE strength to 5/5, for increased limb stability, and to improve gait in 4 weeks.
Full range of motion of upper and lower extremities, no joint tenderness/swelling.

## 2022-10-10 NOTE — ED ADULT NURSE NOTE - NS PRO PASSIVE SMOKE EXP
Patient: Cecilia Salazar    Procedure Summary     Date: 10/09/22 Room / Location:     Anesthesia Start: 1500 Anesthesia Stop: 10/10/22 0453    Procedure: LABOR ANALGESIA Diagnosis:     Scheduled Providers:  Provider: Jack Azar MD    Anesthesia Type: epidural ASA Status: 2          Anesthesia Type: epidural    Vitals  Vitals Value Taken Time   /85 10/10/22 0730   Temp 36.7 °C (98.1 °F) 10/10/22 0730   Pulse 79 10/10/22 0730   Resp 18 10/10/22 0730   SpO2 100 % 10/10/22 0453   Vitals shown include unvalidated device data.        Post Anesthesia Care and Evaluation    Patient location during evaluation: bedside  Patient participation: complete - patient participated  Level of consciousness: awake  Pain management: adequate    Airway patency: patent  Anesthetic complications: No anesthetic complications  PONV Status: controlled  Cardiovascular status: acceptable and stable  Respiratory status: acceptable  Hydration status: acceptable  Post Neuraxial Block status: Motor and sensory function returned to baseline and C/o headache non positional-- will monitor     No

## 2023-12-25 NOTE — ED PROVIDER NOTE - MEDICAL DECISION MAKING DETAILS
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 protecting airway, will continue to monitor to sobriety,